# Patient Record
Sex: MALE | RURAL
[De-identification: names, ages, dates, MRNs, and addresses within clinical notes are randomized per-mention and may not be internally consistent; named-entity substitution may affect disease eponyms.]

---

## 2023-02-06 RX ORDER — FAMOTIDINE 20 MG/1
TABLET, FILM COATED ORAL
Qty: 30 TABLET | Refills: 5 | Status: SHIPPED | OUTPATIENT
Start: 2023-02-06

## 2023-02-09 ENCOUNTER — TELEPHONE (OUTPATIENT)
Dept: CARDIOLOGY | Facility: CLINIC | Age: 58
End: 2023-02-09

## 2023-02-09 NOTE — TELEPHONE ENCOUNTER
Patients wife called and said he needs a refill on his Rosuvastatin.  She said [facility] in [place] has sent a request a few times and they still can't get it refilled.  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 Any questions she said to call her at [phone]

## 2023-02-12 RX ORDER — ROSUVASTATIN CALCIUM 20 MG/1
20 TABLET, COATED ORAL DAILY
Qty: 90 TABLET | Refills: 3 | Status: SHIPPED | OUTPATIENT
Start: 2023-02-12

## 2023-02-15 NOTE — TELEPHONE ENCOUNTER
Patient called back today upset because he had not heard anything back. I read to him what was discussed and he said he has gotten the letter from here for the last 3 years. He said Dr. Galarza is the one he started with and then he started seeing [place] before she left. He would like to talk to someone about the DOT letter

## 2023-05-15 RX ORDER — SACUBITRIL AND VALSARTAN 24; 26 MG/1; MG/1
TABLET, FILM COATED ORAL
Qty: 60 TABLET | Refills: 3 | Status: SHIPPED | OUTPATIENT
Start: 2023-05-15

## 2023-08-28 RX ORDER — SACUBITRIL AND VALSARTAN 24; 26 MG/1; MG/1
TABLET, FILM COATED ORAL
Qty: 60 TABLET | Refills: 3 | Status: SHIPPED | OUTPATIENT
Start: 2023-08-28

## 2023-11-13 ENCOUNTER — TELEPHONE (OUTPATIENT)
Dept: CARDIOLOGY | Facility: CLINIC | Age: 58
End: 2023-11-13
Payer: COMMERCIAL

## 2023-11-13 NOTE — TELEPHONE ENCOUNTER
----- Message from Devan Marlon sent at 11/13/2023 10:59 AM EST -----  Regarding: New Patient (not me)  Contact: [phone]  Yes….. she has all that information.  Prefer a Tuesday or Thursday for appointment - even if that’s the  [place] location. 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  And her address is on our property -     [address]   [address] [facility] [address]    Thank you   
Can we make an appt for patient?  
LIMITED ROM/EFFUSION/SWELLING/JOINT SWELLING

## 2023-12-18 ENCOUNTER — TELEPHONE (OUTPATIENT)
Dept: CARDIOLOGY | Facility: CLINIC | Age: 58
End: 2023-12-18

## 2023-12-18 NOTE — TELEPHONE ENCOUNTER
----- Message from Jay Neo sent at 12/18/2023  9:38 AM EST -----  Regarding: ECHO RESULTS  Patient was scheduled to see Dr. Galarza after scan this morning. Patient is unable to reschedule for Friday and does not know when he can be seen again.. he works 6 days a week and had to take off today for his appt and it is not something he can keep doing. Is it possible to call with results once they have been read?

## 2023-12-22 RX ORDER — METOPROLOL SUCCINATE 25 MG/1
12.5 TABLET, EXTENDED RELEASE ORAL DAILY
Qty: 45 TABLET | Refills: 1 | Status: SHIPPED | OUTPATIENT
Start: 2023-12-22

## 2024-01-26 ENCOUNTER — PATIENT MESSAGE (OUTPATIENT)
Dept: CARDIOLOGY | Facility: CLINIC | Age: 59
End: 2024-01-26
Payer: COMMERCIAL

## 2024-01-26 ENCOUNTER — TELEPHONE (OUTPATIENT)
Dept: CARDIOLOGY | Facility: CLINIC | Age: 59
End: 2024-01-26
Payer: COMMERCIAL

## 2024-01-26 NOTE — LETTER
January 29, 2024           To whom it may concern,    Devan Mason was evaluated in our office on 6/19/2023.  He completed an echocardiogram on 12/18/2023 that revealed an EF of 48% and mild to moderate mitral valve regurgitation.  His heart function has improved from 45% in 2022 to 48% on most recent echo. His mitral valve regurgitation is stable. He is currently stable from a cardiac standpoint. If you need further information, please do not hesitate to contact our office.     Thank you,   JOSE Martin  [facility]

## 2024-01-26 NOTE — TELEPHONE ENCOUNTER
----- Message from Devan Mason sent at 1/26/2024  1:23 PM EST -----  Regarding: Letter  Contact: [phone]  I am in need of the letter to get my yearly DOT Physical!     Thank you so much

## 2024-01-29 NOTE — TELEPHONE ENCOUNTER
Patient is needing letter for DOT physical. Patient was called last week and was told that he needed an appointment, patient was very upset that an appointment was required. Patient has not been seen since 6/2023. Last EKG was in 2022. Please advise.

## 2024-02-29 ENCOUNTER — TELEPHONE (OUTPATIENT)
Dept: CARDIOLOGY | Facility: CLINIC | Age: 59
End: 2024-02-29
Payer: COMMERCIAL

## 2024-02-29 DIAGNOSIS — I25.810 CORONARY ARTERY DISEASE INVOLVING CORONARY BYPASS GRAFT OF NATIVE HEART WITHOUT ANGINA PECTORIS: Primary | ICD-10-CM

## 2024-02-29 DIAGNOSIS — I50.22 CHRONIC SYSTOLIC HEART FAILURE: ICD-10-CM

## 2024-02-29 NOTE — TELEPHONE ENCOUNTER
Patient's wife had sent a message in his FAB BAG regarding labs. That If you would like for them to get any lab work done before his next appointment on June 27th that they would get those done before that appointment.

## 2024-03-11 DIAGNOSIS — I25.810 CORONARY ARTERY DISEASE INVOLVING CORONARY BYPASS GRAFT OF NATIVE HEART WITHOUT ANGINA PECTORIS: ICD-10-CM

## 2024-03-11 RX ORDER — ROSUVASTATIN CALCIUM 40 MG/1
40 TABLET, COATED ORAL DAILY
Qty: 90 TABLET | Refills: 3 | Status: SHIPPED | OUTPATIENT
Start: 2024-03-11

## 2024-03-11 RX ORDER — SACUBITRIL AND VALSARTAN 24; 26 MG/1; MG/1
1 TABLET, FILM COATED ORAL 2 TIMES DAILY
Qty: 180 TABLET | Refills: 3 | Status: SHIPPED | OUTPATIENT
Start: 2024-03-11

## 2024-03-14 ENCOUNTER — TELEPHONE (OUTPATIENT)
Dept: CARDIOLOGY | Facility: CLINIC | Age: 59
End: 2024-03-14

## 2024-03-14 NOTE — TELEPHONE ENCOUNTER
Caller: KELLY MOODY    Relationship: Emergency Contact    Best call back number: [phone]    Requested Prescriptions:   Requested Prescriptions     Pending Prescriptions Disp Refills    omeprazole (priLOSEC) 20 MG capsule      famotidine (PEPCID) 20 MG tablet 30 tablet 5     Sig: Take 1 tablet by mouth Daily.        Pharmacy where request should be sent: [facility] - [place], [place] - [address] [phone] [facility] [phone]      Last office visit with prescribing clinician: 6/19/2023   Last telemedicine visit with prescribing clinician: Visit date not found   Next office visit with prescribing clinician: 6/27/2024     Additional details provided by patient: PATIENT HAS MORE THAN A 3 DAY SUPPLY. REQUESTING DR. SHAH FILL THE ABOVE FOR HIM. PLEASE ADVISE THE ABOVE IF THERE IS A PROBLEM.     Does the patient have less than a 3 day supply:  [] Yes  [x] No    Would you like a call back once the refill request has been completed: [] Yes [x] No    If the office needs to give you a call back, can they leave a voicemail: [] Yes [x] No    Jay Mack Rep   03/14/24 16:28 EDT

## 2024-03-14 NOTE — TELEPHONE ENCOUNTER
Caller: KELLY MOODY    Relationship: Emergency Contact    Best call back number: [phone]    What is the best time to reach you: ANY    What was the call regarding: PLEASE CALL THE ABOVE ABOUT THE LABS.     Is it okay if the provider responds through MyChart: NO

## 2024-03-15 RX ORDER — FAMOTIDINE 20 MG/1
20 TABLET, FILM COATED ORAL DAILY
Qty: 90 TABLET | Refills: 1 | Status: SHIPPED | OUTPATIENT
Start: 2024-03-15

## 2024-03-15 RX ORDER — OMEPRAZOLE 20 MG/1
20 CAPSULE, DELAYED RELEASE ORAL DAILY
Qty: 90 CAPSULE | Refills: 1 | Status: SHIPPED | OUTPATIENT
Start: 2024-03-15

## 2024-06-17 DIAGNOSIS — R53.82 CHRONIC FATIGUE: ICD-10-CM

## 2024-06-17 DIAGNOSIS — I25.810 CORONARY ARTERY DISEASE INVOLVING CORONARY BYPASS GRAFT OF NATIVE HEART WITHOUT ANGINA PECTORIS: ICD-10-CM

## 2024-06-17 DIAGNOSIS — I50.22 CHRONIC SYSTOLIC HEART FAILURE: ICD-10-CM

## 2024-06-27 ENCOUNTER — OFFICE VISIT (OUTPATIENT)
Age: 59
End: 2024-06-27
Payer: COMMERCIAL

## 2024-06-27 VITALS
WEIGHT: 224 LBS | BODY MASS INDEX: 30.34 KG/M2 | HEIGHT: 72 IN | OXYGEN SATURATION: 97 % | DIASTOLIC BLOOD PRESSURE: 70 MMHG | SYSTOLIC BLOOD PRESSURE: 118 MMHG | HEART RATE: 67 BPM

## 2024-06-27 DIAGNOSIS — I50.22 CHRONIC SYSTOLIC HEART FAILURE: ICD-10-CM

## 2024-06-27 DIAGNOSIS — I25.810 CORONARY ARTERY DISEASE INVOLVING CORONARY BYPASS GRAFT OF NATIVE HEART WITHOUT ANGINA PECTORIS: Primary | ICD-10-CM

## 2024-06-27 PROCEDURE — 99214 OFFICE O/P EST MOD 30 MIN: CPT | Performed by: INTERNAL MEDICINE

## 2024-06-27 PROCEDURE — 93000 ELECTROCARDIOGRAM COMPLETE: CPT | Performed by: INTERNAL MEDICINE

## 2024-06-27 RX ORDER — LANOLIN ALCOHOL/MO/W.PET/CERES
1000 CREAM (GRAM) TOPICAL
COMMUNITY

## 2024-06-27 RX ORDER — NITROGLYCERIN 0.4 MG/1
TABLET SUBLINGUAL
Qty: 100 TABLET | Refills: 11 | Status: SHIPPED | OUTPATIENT
Start: 2024-06-27

## 2024-06-27 RX ORDER — ACETAMINOPHEN 160 MG
2000 TABLET,DISINTEGRATING ORAL DAILY
COMMUNITY

## 2024-06-27 NOTE — PROGRESS NOTES
[facility] Provider Note     Date:   2024   Name: Devan Mason  :   1965  PCP: Krystle Dickson MD    Chief Complaint   Patient presents with    Coronary Artery Disease       Subjective     History of Present Illness  Devan Mason is a 59 y.o. male who presents today for follow-up on coronary artery disease  Little twitches and pressure like pain in his heart. On the left side. Its been in the last couple weeks and getting worse.   Getting out of breath pretty easy as well.   Has a lot of foot arch problems and leg problems and dose not feel like he can walk a treadmill.         Cardiology/sleep history  1.  Coronary artery disease  -Status post coronary artery bypass - LIMA to LAD/D1, SVG to PDA, SVG to OM  -LHC  -no other disease noted, grafts patent, LAD and % stenosed  2.  Hypertension  3.  Hypercholesterolemia  4.  Ischemic cardiomyopathy-EF 45%   5.  Mild to moderate mitral regurgitation     No Known Allergies    Current Outpatient Medications:     aspirin 81 MG chewable tablet, Chew 1 tablet Daily., Disp: , Rfl:     chlorpheniramine (Allergy Relief) 4 MG tablet, Take 2.5 tablets by mouth Every 6 (Six) Hours As Needed for Allergies., Disp: , Rfl:     Cholecalciferol (Vitamin D3) 50 MCG (2000 UT) capsule, Take 1 capsule by mouth Daily., Disp: , Rfl:     Cinnamon 500 MG tablet, Take  by mouth., Disp: , Rfl:     famotidine (PEPCID) 20 MG tablet, Take 1 tablet by mouth Daily., Disp: 90 tablet, Rfl: 1    metoprolol succinate XL (TOPROL-XL) 25 MG 24 hr tablet, Take 0.5 tablets by mouth Daily., Disp: 45 tablet, Rfl: 1    Multiple Vitamins-Minerals (MULTIVITAMIN ADULT, MINERALS, PO), Take  by mouth., Disp: , Rfl:     Omega-3 Fatty Acids (fish oil) 1000 MG capsule capsule, Take  by mouth Daily With Breakfast., Disp: , Rfl:     omeprazole (priLOSEC) 20 MG capsule, Take 1 capsule by mouth Daily., Disp: 90 capsule, Rfl: 1    Potassium 99 MG tablet, Take   "by mouth., Disp: , Rfl:     Psyllium (Metamucil) wafer wafer, Take  by mouth Daily., Disp: , Rfl:     rosuvastatin (CRESTOR) 40 MG tablet, Take 1 tablet by mouth Daily., Disp: 90 tablet, Rfl: 3    sacubitril-valsartan (Entresto) 24-26 MG tablet, Take 1 tablet by mouth 2 (Two) Times a Day., Disp: 180 tablet, Rfl: 3    sildenafil (REVATIO) 20 MG tablet, Take 1 tablet by mouth 3 (Three) Times a Day As Needed (erectile dysfunction)., Disp: 30 tablet, Rfl: 11    vitamin B-12 (CYANOCOBALAMIN) 1000 MCG tablet, Take 1 tablet by mouth. 3x per week, Disp: , Rfl:     nitroglycerin (NITROSTAT) 0.4 MG SL tablet, 1 under the tongue as needed for angina, may repeat q5mins for up three doses, Disp: 100 tablet, Rfl: 11    Past Medical History:   Diagnosis Date    Chronic kidney disease     Coronary artery disease     Hyperlipidemia     Hypertension       Past Surgical History:   Procedure Laterality Date    CARDIAC SURGERY  01/30/2017    bypass/ maker 100%, 2 over 90%, 2 over 30%     Family History   Problem Relation Age of Onset    Heart disease Mother     Cancer Mother     Heart disease Brother      Social History     Socioeconomic History    Marital status:    Tobacco Use    Smoking status: Never     Passive exposure: Never    Smokeless tobacco: Never   Vaping Use    Vaping status: Never Used   Substance and Sexual Activity    Alcohol use: Yes     Comment: occ    Drug use: Never    Sexual activity: Defer       Objective     Vital Signs:  /70   Pulse 67   Ht 182.9 cm (72\")   Wt 102 kg (224 lb)   SpO2 97%   BMI 30.38 kg/m²   Estimated body mass index is 30.38 kg/m² as calculated from the following:    Height as of this encounter: 182.9 cm (72\").    Weight as of this encounter: 102 kg (224 lb).         Physical Exam  Constitutional:       Appearance: Normal appearance. He is well-developed.   HENT:      Head: Normocephalic and atraumatic.   Eyes:      General: No scleral icterus.     Pupils: Pupils are equal, " round, and reactive to light.   Cardiovascular:      Rate and Rhythm: Normal rate and regular rhythm.      Heart sounds: Normal heart sounds. No murmur heard.  Pulmonary:      Breath sounds: Normal breath sounds. No wheezing or rhonchi.   Musculoskeletal:      Right lower leg: No edema.      Left lower leg: No edema.   Skin:     Capillary Refill: Capillary refill takes less than 2 seconds.      Coloration: Skin is not cyanotic.      Nails: There is no clubbing.   Neurological:      Mental Status: He is alert and oriented to person, place, and time.      Motor: No weakness.      Gait: Gait normal.   Psychiatric:         Mood and Affect: Mood normal.         Behavior: Behavior is cooperative.         Thought Content: Thought content normal.         Cognition and Memory: Memory normal.                 ECG 12 Lead    Date/Time: 6/27/2024 4:06 PM  Performed by: Mandie Galarza MD    Authorized by: Mandie Galarza MD  Comparison: compared with previous ECG   Comparison to previous ECG: Inverted T waves inferiorly  Rhythm: sinus rhythm  Rate: normal  T inversion: V4, V5 and V6  QRS axis: normal  Other: no other findings    Clinical impression: abnormal EKG           Assessment and Plan     Diagnoses and all orders for this visit:    1. Coronary artery disease involving coronary bypass graft of native heart without angina pectoris (Primary)  Comments:  Planning stress testing.  Does not seem like fluid overload.  Orders:  -     Stress Test With Myocardial Perfusion One Day; Future  -     nitroglycerin (NITROSTAT) 0.4 MG SL tablet; 1 under the tongue as needed for angina, may repeat q5mins for up three doses  Dispense: 100 tablet; Refill: 11  -     ECG 12 Lead    2. Chronic systolic heart failure  Comments:  Euvolemic on exam today and no edema.  Echo has been updated recently and is fairly stable from prior.        Recommendations: ER if symptoms increase and Report if any new/changing symptoms immediately      Follow  Up  No follow-ups on file.  After stress testing   Mandie Galarza MD   06/27/2024     Please note that this explicitly excludes time spent on other separate billable services such as performing procedures or test interpretation, when applicable.    This note was created using dictation software which occasionally transcribes nonsensical phrases. Please contact the provider if any clarification is needed.

## 2024-07-01 RX ORDER — METOPROLOL SUCCINATE 25 MG/1
12.5 TABLET, EXTENDED RELEASE ORAL DAILY
Qty: 45 TABLET | Refills: 1 | Status: SHIPPED | OUTPATIENT
Start: 2024-07-01

## 2024-08-20 ENCOUNTER — LAB (OUTPATIENT)
Facility: HOSPITAL | Age: 59
End: 2024-08-20
Payer: COMMERCIAL

## 2024-08-20 ENCOUNTER — HOSPITAL ENCOUNTER (OUTPATIENT)
Facility: HOSPITAL | Age: 59
Discharge: HOME | End: 2024-08-20
Payer: COMMERCIAL

## 2024-08-20 ENCOUNTER — OFFICE VISIT (OUTPATIENT)
Age: 59
End: 2024-08-20
Payer: COMMERCIAL

## 2024-08-20 VITALS
BODY MASS INDEX: 30.34 KG/M2 | HEART RATE: 77 BPM | SYSTOLIC BLOOD PRESSURE: 122 MMHG | DIASTOLIC BLOOD PRESSURE: 72 MMHG | HEIGHT: 72 IN | WEIGHT: 224 LBS | OXYGEN SATURATION: 97 %

## 2024-08-20 DIAGNOSIS — I25.810 CORONARY ARTERY DISEASE INVOLVING CORONARY BYPASS GRAFT OF NATIVE HEART WITHOUT ANGINA PECTORIS: ICD-10-CM

## 2024-08-20 DIAGNOSIS — I50.22 CHRONIC SYSTOLIC HEART FAILURE: ICD-10-CM

## 2024-08-20 DIAGNOSIS — I25.810 CORONARY ARTERY DISEASE INVOLVING CORONARY BYPASS GRAFT OF NATIVE HEART WITHOUT ANGINA PECTORIS: Primary | ICD-10-CM

## 2024-08-20 LAB
BASOPHILS # BLD AUTO: 0.1 10*3/MM3 (ref 0–0.2)
BASOPHILS NFR BLD AUTO: 0.9 % (ref 0–1.5)
BH CV REST NUCLEAR ISOTOPE DOSE: 9.7 MCI
BH CV STRESS BP STAGE 2: NORMAL
BH CV STRESS BP STAGE 4: NORMAL
BH CV STRESS COMMENTS STAGE 1: NORMAL
BH CV STRESS DOSE REGADENOSON STAGE 1: 0.4
BH CV STRESS DURATION MIN STAGE 1: 1
BH CV STRESS DURATION MIN STAGE 2: 1
BH CV STRESS DURATION MIN STAGE 3: 1
BH CV STRESS DURATION MIN STAGE 4: 1
BH CV STRESS DURATION SEC STAGE 1: 0
BH CV STRESS DURATION SEC STAGE 2: 0
BH CV STRESS DURATION SEC STAGE 3: 0
BH CV STRESS DURATION SEC STAGE 4: 0
BH CV STRESS HR STAGE 1: 68
BH CV STRESS HR STAGE 2: 83
BH CV STRESS HR STAGE 3: 75
BH CV STRESS HR STAGE 4: 73
BH CV STRESS NUCLEAR ISOTOPE DOSE: 32.6 MCI
BH CV STRESS O2 STAGE 1: 95
BH CV STRESS O2 STAGE 2: 96
BH CV STRESS O2 STAGE 3: 98
BH CV STRESS O2 STAGE 4: 99
BH CV STRESS PROTOCOL 1: NORMAL
BH CV STRESS RECOVERY BP: NORMAL MMHG
BH CV STRESS RECOVERY HR: 67 BPM
BH CV STRESS RECOVERY O2: 98 %
BH CV STRESS STAGE 1: 1
BH CV STRESS STAGE 2: 2
BH CV STRESS STAGE 3: 3
BH CV STRESS STAGE 4: 4
DEPRECATED RDW RBC AUTO: 43.8 FL (ref 37–54)
EOSINOPHIL # BLD AUTO: 0.19 10*3/MM3 (ref 0–0.4)
EOSINOPHIL NFR BLD AUTO: 1.7 % (ref 0.3–6.2)
ERYTHROCYTE [DISTWIDTH] IN BLOOD BY AUTOMATED COUNT: 13.1 % (ref 12.3–15.4)
HCT VFR BLD AUTO: 50 % (ref 37.5–51)
HGB BLD-MCNC: 17.2 G/DL (ref 13–17.7)
IMM GRANULOCYTES # BLD AUTO: 0.08 10*3/MM3 (ref 0–0.05)
IMM GRANULOCYTES NFR BLD AUTO: 0.7 % (ref 0–0.5)
LV EF NUC BP: 39 %
LYMPHOCYTES # BLD AUTO: 2.74 10*3/MM3 (ref 0.7–3.1)
LYMPHOCYTES NFR BLD AUTO: 25.1 % (ref 19.6–45.3)
MAXIMAL PREDICTED HEART RATE: 159 BPM
MCH RBC QN AUTO: 31.3 PG (ref 26.6–33)
MCHC RBC AUTO-ENTMCNC: 34.4 G/DL (ref 31.5–35.7)
MCV RBC AUTO: 91.1 FL (ref 79–97)
MONOCYTES # BLD AUTO: 0.81 10*3/MM3 (ref 0.1–0.9)
MONOCYTES NFR BLD AUTO: 7.4 % (ref 5–12)
NEUTROPHILS NFR BLD AUTO: 64.2 % (ref 42.7–76)
NEUTROPHILS NFR BLD AUTO: 7 10*3/MM3 (ref 1.7–7)
NRBC BLD AUTO-RTO: 0 /100 WBC (ref 0–0.2)
PERCENT MAX PREDICTED HR: 52.2 %
PLATELET # BLD AUTO: 224 10*3/MM3 (ref 140–450)
PMV BLD AUTO: 9.9 FL (ref 6–12)
RBC # BLD AUTO: 5.49 10*6/MM3 (ref 4.14–5.8)
STRESS BASELINE BP: NORMAL MMHG
STRESS BASELINE HR: 62 BPM
STRESS O2 SAT REST: 97 %
STRESS PERCENT HR: 61 %
STRESS POST ESTIMATED WORKLOAD: 1 METS
STRESS POST EXERCISE DUR MIN: 4 MIN
STRESS POST EXERCISE DUR SEC: 0 SEC
STRESS POST O2 SAT PEAK: 99 %
STRESS POST PEAK BP: NORMAL MMHG
STRESS POST PEAK HR: 83 BPM
STRESS TARGET HR: 135 BPM
WBC NRBC COR # BLD AUTO: 10.92 10*3/MM3 (ref 3.4–10.8)

## 2024-08-20 PROCEDURE — 78452 HT MUSCLE IMAGE SPECT MULT: CPT

## 2024-08-20 PROCEDURE — 93017 CV STRESS TEST TRACING ONLY: CPT

## 2024-08-20 PROCEDURE — 36415 COLL VENOUS BLD VENIPUNCTURE: CPT

## 2024-08-20 PROCEDURE — A9500 TC99M SESTAMIBI: HCPCS | Performed by: INTERNAL MEDICINE

## 2024-08-20 PROCEDURE — 99214 OFFICE O/P EST MOD 30 MIN: CPT | Performed by: INTERNAL MEDICINE

## 2024-08-20 PROCEDURE — 25010000002 REGADENOSON 0.4 MG/5ML SOLUTION: Performed by: INTERNAL MEDICINE

## 2024-08-20 PROCEDURE — 80053 COMPREHEN METABOLIC PANEL: CPT

## 2024-08-20 PROCEDURE — 85025 COMPLETE CBC W/AUTO DIFF WBC: CPT

## 2024-08-20 PROCEDURE — 0 TECHNETIUM SESTAMIBI: Performed by: INTERNAL MEDICINE

## 2024-08-20 RX ORDER — REGADENOSON 0.08 MG/ML
0.4 INJECTION, SOLUTION INTRAVENOUS ONCE
Status: COMPLETED | OUTPATIENT
Start: 2024-08-20 | End: 2024-08-20

## 2024-08-20 RX ADMIN — TECHNETIUM TC 99M SESTAMIBI 1 DOSE: 1 INJECTION INTRAVENOUS at 08:10

## 2024-08-20 RX ADMIN — REGADENOSON 0.4 MG: 0.08 INJECTION INTRAVENOUS at 09:41

## 2024-08-20 RX ADMIN — TECHNETIUM TC 99M SESTAMIBI 1 DOSE: 1 INJECTION INTRAVENOUS at 09:45

## 2024-08-20 NOTE — H&P (VIEW-ONLY)
[facility] Provider Note     Date:   2024   Name: Devan Mason  :   1965  PCP: Krystle Dickson MD    Chief Complaint   Patient presents with    Coronary Artery Disease     Deepthi Results       Subjective     History of Present Illness  Devan Mason is a 59 y.o. male who presents today for follow up CAD and lexiscan  He does note he has been more tired than usual. Does have occasional chest heaviness.  His stress test showed mostly scarring but he feels like his symptoms have gotten worse especially in the last 6 months to a year.    Cardiology/sleep history  1.  Coronary artery disease  -Status post coronary artery bypass - LIMA to LAD/D1, SVG to PDA, SVG to OM  -LHC  -no other disease noted, grafts patent, LAD and % stenosed  2.  Hypertension  3.  Hypercholesterolemia  4.  Ischemic cardiomyopathy-EF 45%   5.  Mild to moderate mitral regurgitation     No Known Allergies    Current Outpatient Medications:     aspirin 81 MG chewable tablet, Chew 1 tablet Daily., Disp: , Rfl:     chlorpheniramine (Allergy Relief) 4 MG tablet, Take 2.5 tablets by mouth Every 6 (Six) Hours As Needed for Allergies., Disp: , Rfl:     Cholecalciferol (Vitamin D3) 50 MCG (2000 UT) capsule, Take 1 capsule by mouth Daily., Disp: , Rfl:     Cinnamon 500 MG tablet, Take  by mouth., Disp: , Rfl:     famotidine (PEPCID) 20 MG tablet, Take 1 tablet by mouth Daily., Disp: 90 tablet, Rfl: 1    metoprolol succinate XL (TOPROL-XL) 25 MG 24 hr tablet, Take 0.5 tablets by mouth Daily., Disp: 45 tablet, Rfl: 1    Multiple Vitamins-Minerals (MULTIVITAMIN ADULT, MINERALS, PO), Take  by mouth., Disp: , Rfl:     nitroglycerin (NITROSTAT) 0.4 MG SL tablet, 1 under the tongue as needed for angina, may repeat q5mins for up three doses, Disp: 100 tablet, Rfl: 11    Omega-3 Fatty Acids (fish oil) 1000 MG capsule capsule, Take  by mouth Daily With Breakfast., Disp: , Rfl:     omeprazole  "(priLOSEC) 20 MG capsule, Take 1 capsule by mouth Daily., Disp: 90 capsule, Rfl: 1    Potassium 99 MG tablet, Take  by mouth., Disp: , Rfl:     Psyllium (Metamucil) wafer wafer, Take  by mouth Daily., Disp: , Rfl:     rosuvastatin (CRESTOR) 40 MG tablet, Take 1 tablet by mouth Daily., Disp: 90 tablet, Rfl: 3    sacubitril-valsartan (Entresto) 24-26 MG tablet, Take 1 tablet by mouth 2 (Two) Times a Day., Disp: 180 tablet, Rfl: 3    sildenafil (REVATIO) 20 MG tablet, Take 1 tablet by mouth 3 (Three) Times a Day As Needed (erectile dysfunction)., Disp: 30 tablet, Rfl: 11    vitamin B-12 (CYANOCOBALAMIN) 1000 MCG tablet, Take 1 tablet by mouth. 3x per week, Disp: , Rfl:   No current facility-administered medications for this visit.    Past Medical History:   Diagnosis Date    Chronic kidney disease     Coronary artery disease     Hyperlipidemia     Hypertension       Past Surgical History:   Procedure Laterality Date    CARDIAC SURGERY  01/30/2017    bypass/ maker 100%, 2 over 90%, 2 over 30%     Family History   Problem Relation Age of Onset    Heart disease Mother     Cancer Mother     Heart disease Brother      Social History     Socioeconomic History    Marital status:    Tobacco Use    Smoking status: Never     Passive exposure: Never    Smokeless tobacco: Never   Vaping Use    Vaping status: Never Used   Substance and Sexual Activity    Alcohol use: Yes     Comment: occ    Drug use: Never    Sexual activity: Yes     Partners: Female       Objective     Vital Signs:  /72   Pulse 77   Ht 182.9 cm (72\")   Wt 102 kg (224 lb)   SpO2 97%   BMI 30.38 kg/m²   Estimated body mass index is 30.38 kg/m² as calculated from the following:    Height as of this encounter: 182.9 cm (72\").    Weight as of this encounter: 102 kg (224 lb).         Physical Exam  Constitutional:       Appearance: Normal appearance. He is well-developed.   HENT:      Head: Normocephalic and atraumatic.   Eyes:      General: No " scleral icterus.     Pupils: Pupils are equal, round, and reactive to light.   Cardiovascular:      Rate and Rhythm: Normal rate and regular rhythm.      Heart sounds: Normal heart sounds. No murmur heard.  Pulmonary:      Breath sounds: Normal breath sounds. No wheezing or rhonchi.   Musculoskeletal:      Right lower leg: No edema.      Left lower leg: No edema.   Skin:     Capillary Refill: Capillary refill takes less than 2 seconds.      Coloration: Skin is not cyanotic.      Nails: There is no clubbing.   Neurological:      Mental Status: He is alert and oriented to person, place, and time.      Motor: No weakness.      Gait: Gait normal.   Psychiatric:         Mood and Affect: Mood normal.         Behavior: Behavior is cooperative.         Thought Content: Thought content normal.         Cognition and Memory: Memory normal.                     Assessment and Plan     Diagnoses and all orders for this visit:    1. Coronary artery disease involving coronary bypass graft of native heart without angina pectoris (Primary)  Comments:  Discussed risks and benefits of heart catheterization.  Patient willing to proceed.  He would be anxious without proceeding.  Orders:  -     Case Request Cath Lab: Left Heart Cath  -     CBC & Differential; Future  -     Comprehensive Metabolic Panel; Future    2. Chronic systolic heart failure  Comments:  May increase Entresto dose after heart cath.              Follow Up  No follow-ups on file.    Mandie Galarza MD   08/20/2024     Please note that this explicitly excludes time spent on other separate billable services such as performing procedures or test interpretation, when applicable.    This note was created using dictation software which occasionally transcribes nonsensical phrases. Please contact the provider if any clarification is needed.

## 2024-08-21 LAB
ALBUMIN SERPL-MCNC: 4.8 G/DL (ref 3.5–5.2)
ALBUMIN/GLOB SERPL: 1.9 G/DL
ALP SERPL-CCNC: 77 U/L (ref 39–117)
ALT SERPL W P-5'-P-CCNC: 41 U/L (ref 1–41)
ANION GAP SERPL CALCULATED.3IONS-SCNC: 13 MMOL/L (ref 5–15)
AST SERPL-CCNC: 25 U/L (ref 1–40)
BILIRUB SERPL-MCNC: 0.4 MG/DL (ref 0–1.2)
BUN SERPL-MCNC: 17 MG/DL (ref 8–23)
BUN/CREAT SERPL: 12.5 (ref 7–25)
CALCIUM SPEC-SCNC: 9.7 MG/DL (ref 8.6–10.5)
CHLORIDE SERPL-SCNC: 102 MMOL/L (ref 98–107)
CO2 SERPL-SCNC: 24 MMOL/L (ref 22–29)
CREAT SERPL-MCNC: 1.36 MG/DL (ref 0.76–1.27)
EGFRCR SERPLBLD CKD-EPI 2021: 59.2 ML/MIN/1.73
GLOBULIN UR ELPH-MCNC: 2.5 GM/DL
GLUCOSE SERPL-MCNC: 98 MG/DL (ref 65–99)
POTASSIUM SERPL-SCNC: 4.2 MMOL/L (ref 3.5–5.2)
PROT SERPL-MCNC: 7.3 G/DL (ref 6–8.5)
SODIUM SERPL-SCNC: 139 MMOL/L (ref 136–145)

## 2024-08-30 ENCOUNTER — APPOINTMENT (OUTPATIENT)
Dept: GENERAL RADIOLOGY | Facility: HOSPITAL | Age: 59
End: 2024-08-30
Payer: COMMERCIAL

## 2024-08-30 ENCOUNTER — HOSPITAL ENCOUNTER (OUTPATIENT)
Facility: HOSPITAL | Age: 59
Setting detail: HOSPITAL OUTPATIENT SURGERY
Discharge: HOME | End: 2024-08-30
Attending: INTERNAL MEDICINE | Admitting: INTERNAL MEDICINE
Payer: COMMERCIAL

## 2024-08-30 VITALS
OXYGEN SATURATION: 94 % | HEART RATE: 56 BPM | SYSTOLIC BLOOD PRESSURE: 124 MMHG | DIASTOLIC BLOOD PRESSURE: 87 MMHG | TEMPERATURE: 97.3 F | RESPIRATION RATE: 16 BRPM

## 2024-08-30 DIAGNOSIS — I25.810 CORONARY ARTERY DISEASE INVOLVING CORONARY BYPASS GRAFT OF NATIVE HEART WITHOUT ANGINA PECTORIS: ICD-10-CM

## 2024-08-30 LAB
ALBUMIN SERPL-MCNC: 4.4 G/DL (ref 3.5–5.2)
ALBUMIN/GLOB SERPL: 1.7 G/DL
ALP SERPL-CCNC: 67 U/L (ref 39–117)
ALT SERPL W P-5'-P-CCNC: 40 U/L (ref 1–41)
ANION GAP SERPL CALCULATED.3IONS-SCNC: 10 MMOL/L (ref 5–15)
AST SERPL-CCNC: 28 U/L (ref 1–40)
BILIRUB SERPL-MCNC: 0.6 MG/DL (ref 0–1.2)
BUN SERPL-MCNC: 14 MG/DL (ref 8–23)
BUN/CREAT SERPL: 11.4 (ref 7–25)
CALCIUM SPEC-SCNC: 8.9 MG/DL (ref 8.6–10.5)
CATH EF ESTIMATED: 30 %
CHLORIDE SERPL-SCNC: 103 MMOL/L (ref 98–107)
CHOLEST SERPL-MCNC: 141 MG/DL (ref 0–200)
CO2 SERPL-SCNC: 24 MMOL/L (ref 22–29)
CREAT SERPL-MCNC: 1.23 MG/DL (ref 0.76–1.27)
DEPRECATED RDW RBC AUTO: 43 FL (ref 37–54)
EGFRCR SERPLBLD CKD-EPI 2021: 66.8 ML/MIN/1.73
ERYTHROCYTE [DISTWIDTH] IN BLOOD BY AUTOMATED COUNT: 13.1 % (ref 12.3–15.4)
GLOBULIN UR ELPH-MCNC: 2.6 GM/DL
GLUCOSE SERPL-MCNC: 93 MG/DL (ref 65–99)
HBA1C MFR BLD: 5.7 % (ref 4.8–5.6)
HCT VFR BLD AUTO: 47.4 % (ref 37.5–51)
HDLC SERPL-MCNC: 39 MG/DL (ref 40–60)
HGB BLD-MCNC: 16.1 G/DL (ref 13–17.7)
LDLC SERPL CALC-MCNC: 78 MG/DL (ref 0–100)
LDLC/HDLC SERPL: 1.93 {RATIO}
MCH RBC QN AUTO: 30.8 PG (ref 26.6–33)
MCHC RBC AUTO-ENTMCNC: 34 G/DL (ref 31.5–35.7)
MCV RBC AUTO: 90.8 FL (ref 79–97)
PLATELET # BLD AUTO: 172 10*3/MM3 (ref 140–450)
PMV BLD AUTO: 9.4 FL (ref 6–12)
POTASSIUM SERPL-SCNC: 4.7 MMOL/L (ref 3.5–5.2)
PROT SERPL-MCNC: 7 G/DL (ref 6–8.5)
RBC # BLD AUTO: 5.22 10*6/MM3 (ref 4.14–5.8)
SODIUM SERPL-SCNC: 137 MMOL/L (ref 136–145)
TRIGL SERPL-MCNC: 134 MG/DL (ref 0–150)
VLDLC SERPL-MCNC: 24 MG/DL (ref 5–40)
WBC NRBC COR # BLD AUTO: 9.12 10*3/MM3 (ref 3.4–10.8)

## 2024-08-30 PROCEDURE — 25010000002 MIDAZOLAM PER 1 MG: Performed by: INTERNAL MEDICINE

## 2024-08-30 PROCEDURE — C1894 INTRO/SHEATH, NON-LASER: HCPCS | Performed by: INTERNAL MEDICINE

## 2024-08-30 PROCEDURE — 25010000002 HEPARIN (PORCINE) PER 1000 UNITS: Performed by: INTERNAL MEDICINE

## 2024-08-30 PROCEDURE — 25810000003 SODIUM CHLORIDE 0.9 % SOLUTION: Performed by: NURSE PRACTITIONER

## 2024-08-30 PROCEDURE — 25810000003 SODIUM CHLORIDE 0.9 % SOLUTION: Performed by: INTERNAL MEDICINE

## 2024-08-30 PROCEDURE — 25010000002 NICARDIPINE 2.5 MG/ML SOLUTION: Performed by: INTERNAL MEDICINE

## 2024-08-30 PROCEDURE — 93459 L HRT ART/GRFT ANGIO: CPT | Performed by: INTERNAL MEDICINE

## 2024-08-30 PROCEDURE — 25510000001 IOPAMIDOL PER 1 ML: Performed by: INTERNAL MEDICINE

## 2024-08-30 PROCEDURE — 25010000002 FENTANYL CITRATE (PF) 50 MCG/ML SOLUTION: Performed by: INTERNAL MEDICINE

## 2024-08-30 PROCEDURE — 83036 HEMOGLOBIN GLYCOSYLATED A1C: CPT | Performed by: NURSE PRACTITIONER

## 2024-08-30 PROCEDURE — 25010000002 PHENYLEPHRINE 10 MG/ML SOLUTION: Performed by: INTERNAL MEDICINE

## 2024-08-30 PROCEDURE — 80053 COMPREHEN METABOLIC PANEL: CPT | Performed by: NURSE PRACTITIONER

## 2024-08-30 PROCEDURE — 80061 LIPID PANEL: CPT | Performed by: NURSE PRACTITIONER

## 2024-08-30 PROCEDURE — 85027 COMPLETE CBC AUTOMATED: CPT | Performed by: NURSE PRACTITIONER

## 2024-08-30 PROCEDURE — 36415 COLL VENOUS BLD VENIPUNCTURE: CPT

## 2024-08-30 PROCEDURE — C1769 GUIDE WIRE: HCPCS | Performed by: INTERNAL MEDICINE

## 2024-08-30 PROCEDURE — 71045 X-RAY EXAM CHEST 1 VIEW: CPT

## 2024-08-30 RX ORDER — LIDOCAINE HYDROCHLORIDE 10 MG/ML
INJECTION, SOLUTION EPIDURAL; INFILTRATION; INTRACAUDAL; PERINEURAL
Status: DISCONTINUED | OUTPATIENT
Start: 2024-08-30 | End: 2024-08-30 | Stop reason: HOSPADM

## 2024-08-30 RX ORDER — HEPARIN SODIUM 1000 [USP'U]/ML
INJECTION, SOLUTION INTRAVENOUS; SUBCUTANEOUS
Status: DISCONTINUED | OUTPATIENT
Start: 2024-08-30 | End: 2024-08-30 | Stop reason: HOSPADM

## 2024-08-30 RX ORDER — ACETAMINOPHEN 325 MG/1
650 TABLET ORAL EVERY 4 HOURS PRN
Status: DISCONTINUED | OUTPATIENT
Start: 2024-08-30 | End: 2024-08-30 | Stop reason: HOSPADM

## 2024-08-30 RX ORDER — SODIUM CHLORIDE 0.9 % (FLUSH) 0.9 %
10 SYRINGE (ML) INJECTION EVERY 12 HOURS SCHEDULED
Status: DISCONTINUED | OUTPATIENT
Start: 2024-08-30 | End: 2024-08-30 | Stop reason: HOSPADM

## 2024-08-30 RX ORDER — SODIUM CHLORIDE 9 MG/ML
40 INJECTION, SOLUTION INTRAVENOUS AS NEEDED
Status: DISCONTINUED | OUTPATIENT
Start: 2024-08-30 | End: 2024-08-30 | Stop reason: HOSPADM

## 2024-08-30 RX ORDER — ASPIRIN 325 MG
325 TABLET, DELAYED RELEASE (ENTERIC COATED) ORAL DAILY
Status: DISCONTINUED | OUTPATIENT
Start: 2024-08-31 | End: 2024-08-30 | Stop reason: HOSPADM

## 2024-08-30 RX ORDER — PHENYLEPHRINE HYDROCHLORIDE 10 MG/ML
INJECTION INTRAVENOUS
Status: DISCONTINUED | OUTPATIENT
Start: 2024-08-30 | End: 2024-08-30 | Stop reason: HOSPADM

## 2024-08-30 RX ORDER — ASPIRIN 325 MG
325 TABLET ORAL ONCE
Status: COMPLETED | OUTPATIENT
Start: 2024-08-30 | End: 2024-08-30

## 2024-08-30 RX ORDER — MIDAZOLAM HYDROCHLORIDE 1 MG/ML
INJECTION INTRAMUSCULAR; INTRAVENOUS
Status: DISCONTINUED | OUTPATIENT
Start: 2024-08-30 | End: 2024-08-30 | Stop reason: HOSPADM

## 2024-08-30 RX ORDER — FENTANYL CITRATE 50 UG/ML
INJECTION, SOLUTION INTRAMUSCULAR; INTRAVENOUS
Status: DISCONTINUED | OUTPATIENT
Start: 2024-08-30 | End: 2024-08-30 | Stop reason: HOSPADM

## 2024-08-30 RX ORDER — SODIUM CHLORIDE 0.9 % (FLUSH) 0.9 %
1-10 SYRINGE (ML) INJECTION AS NEEDED
Status: DISCONTINUED | OUTPATIENT
Start: 2024-08-30 | End: 2024-08-30 | Stop reason: HOSPADM

## 2024-08-30 RX ORDER — ONDANSETRON 2 MG/ML
4 INJECTION INTRAMUSCULAR; INTRAVENOUS EVERY 6 HOURS PRN
Status: DISCONTINUED | OUTPATIENT
Start: 2024-08-30 | End: 2024-08-30 | Stop reason: HOSPADM

## 2024-08-30 RX ORDER — NITROGLYCERIN 0.4 MG/1
0.4 TABLET SUBLINGUAL
Status: DISCONTINUED | OUTPATIENT
Start: 2024-08-30 | End: 2024-08-30 | Stop reason: HOSPADM

## 2024-08-30 RX ORDER — NICARDIPINE HCL-0.9% SOD CHLOR 1 MG/10 ML
SYRINGE (ML) INTRAVENOUS
Status: DISCONTINUED | OUTPATIENT
Start: 2024-08-30 | End: 2024-08-30 | Stop reason: HOSPADM

## 2024-08-30 RX ORDER — IOPAMIDOL 755 MG/ML
INJECTION, SOLUTION INTRAVASCULAR
Status: DISCONTINUED | OUTPATIENT
Start: 2024-08-30 | End: 2024-08-30 | Stop reason: HOSPADM

## 2024-08-30 RX ADMIN — ASPIRIN 325 MG: 325 TABLET ORAL at 10:44

## 2024-08-30 RX ADMIN — SODIUM CHLORIDE 306 ML: 9 INJECTION, SOLUTION INTRAVENOUS at 10:45

## 2024-08-30 NOTE — INTERVAL H&P NOTE
H&P reviewed. The patient was examined and there are no changes to the H&P.    Will proceed to cardiac catheterization plus or minus catheter-based intervention today via left radial access.  This was discussed with the patient.  He verbalizes understanding and wishes to proceed.  Further recommendations to follow procedure.    JOSE Meeks

## 2024-08-30 NOTE — CONSULTS
Diabetes Education    Patient Name:  Devan Mason  YOB: 1965  MRN: [id number]  Admit Date:  8/30/2024      Consult received for diabetes education. Chart reviewed. A1c is 5.7%, no history of diabetes. Please reconsult as needed.       Electronically signed by:  Tianna Dey RN  08/30/24 14:26 EDT

## 2024-08-30 NOTE — Clinical Note
Hemostasis started on the left radial artery. R-Band was used in achieving hemostasis. Radial compression device applied to vessel. Hemostasis achieved successfully. Closure device additional comment: 10 cc air

## 2024-09-12 ENCOUNTER — TELEPHONE (OUTPATIENT)
Age: 59
End: 2024-09-12
Payer: COMMERCIAL

## 2024-09-12 NOTE — LETTER
Cardiac Risk Assessment Review        Patient Name: Devan Mason  Patient :   1965  Surgical Procedure: Left shoulder scope  Date of clearance: 2024  Date of last office visit: 2024   Procedure/Test Performed Date   [x] Stress Test 2024             [x] Heart Cath 2024     Based on the above test results and/or clinical evaluation, it is my recommendation:    [x] Patient has an acceptable cardiac risk for the procedure as planned.    [x] Patient is not listed on any blood thinners that would need to be held.        [x] Beta blockers should not be held in the perioperative period.      If you have any questions, please call our office at [phone].    Thank you,      Mandie Galarza MD   [facility] [place]      GovindIsabel leonard, [facility] Rep  [phone]  Signed  [facility] is requesting cardiac clearance for a L shoulder scope with general anesthesia. Cardiac clearance form scanned in

## 2024-09-12 NOTE — TELEPHONE ENCOUNTER
[facility] is requesting cardiac clearance for a L shoulder scope with general anesthesia. Cardiac clearance form scanned in

## 2024-09-27 RX ORDER — FAMOTIDINE 20 MG/1
20 TABLET, FILM COATED ORAL DAILY
Qty: 90 TABLET | Refills: 1 | Status: SHIPPED | OUTPATIENT
Start: 2024-09-27

## 2024-10-03 ENCOUNTER — OFFICE VISIT (OUTPATIENT)
Age: 59
End: 2024-10-03
Payer: COMMERCIAL

## 2024-10-03 VITALS
BODY MASS INDEX: 31.02 KG/M2 | SYSTOLIC BLOOD PRESSURE: 124 MMHG | DIASTOLIC BLOOD PRESSURE: 78 MMHG | WEIGHT: 229 LBS | HEART RATE: 72 BPM | RESPIRATION RATE: 18 BRPM | HEIGHT: 72 IN

## 2024-10-03 DIAGNOSIS — I50.22 CHRONIC SYSTOLIC HEART FAILURE: ICD-10-CM

## 2024-10-03 DIAGNOSIS — I25.810 CORONARY ARTERY DISEASE INVOLVING CORONARY BYPASS GRAFT OF NATIVE HEART WITHOUT ANGINA PECTORIS: ICD-10-CM

## 2024-10-03 DIAGNOSIS — Z01.810 PRE-OPERATIVE CARDIOVASCULAR EXAMINATION: Primary | ICD-10-CM

## 2024-10-03 PROCEDURE — 99214 OFFICE O/P EST MOD 30 MIN: CPT | Performed by: INTERNAL MEDICINE

## 2024-10-03 NOTE — LETTER
October 14, 2024     Uzair Galarza MD  [address]  [address]  [facility] [address]    Patient: Devan Mason   YOB: 1965   Date of Visit: 10/3/2024       Dear Uzair Galarza MD:    Thank you for referring Devan Mason to me for evaluation. Below are the relevant portions of my assessment and plan of care.    Encounter Diagnosis and Orders:  Diagnoses and all orders for this visit:    1. Pre-operative cardiovascular examination (Primary)  Comments:  OK for shoulder surgery. intermediate risk, optimized from a medical standpoint.    2. Chronic systolic heart failure  Comments:  on medical therapy, patient willing see about getting ICD with EP cardiology. will need a repeat EF in 90 days. EF declined at cath despite medical therapy  Orders:  -     Adult Transthoracic Echo Complete W/ Cont if Necessary Per Protocol; Future  -     Ambulatory Referral to Cardiac Electrophysiology    3. Coronary artery disease involving coronary bypass graft of native heart without angina pectoris  Comments:  cath reviewed, all bypass grafts stable, no indication for PCI, continue prevention and medical managment. No anginal syptoms.      If you have questions, please do not hesitate to call me. I look forward to following Devan along with you.         Sincerely,        Mandie Galarza MD        CC: No Recipients

## 2024-10-03 NOTE — PROGRESS NOTES
[facility] Provider Note     Date:   10/03/2024   Name: Devan Mason  :   1965  PCP: Krystle Dickson MD    Chief Complaint   Patient presents with    Follow-up     Follow up from heart cath       Subjective     History of Present Illness  Devan Mason is a 59 y.o. male who presents today for follow up after heart cath for history of ischemic CMP and abnormal stress test. Ef 30% at cath, despite medical therapy. First time EF has fallen below 35%, will need to reassess at 90 day gabriela. Discussed ICD, patient is not interested right now and we would have to wait till 90 days. Patient agrees to meet with Ep to discuss further though, but he would want to delay implant for a while.   Wants to do shoulder surgery first because he cannot sleep with it right now. Since he is euvolemic and stable, and heart cath showed no new ischemia I think that is reasonable. Especially since it is affecting quality of life.     Cardiology/sleep history  1.  Coronary artery disease  -Status post coronary artery bypass - LIMA to LAD/D1, SVG to PDA, SVG to OM  -[facility]  -no other disease noted, grafts patent, LAD and % stenosed  -[facility] 2024   All widely patent grafts. Native vessel severe RCA, left main disease with with occluded LAD.  Patent LIMA to LAD Patent SVG to small diagonal  Patent SVG to OM1 and OM 3. Patent SVG to distal RCA LVEF 30%    2.  Hypertension  3.  Hypercholesterolemia  4.  Ischemic cardiomyopathy-EF 45%   -30% at cath 2024  5.  Mild to moderate mitral regurgitation     No Known Allergies    Current Outpatient Medications:     aspirin 81 MG chewable tablet, Chew 1 tablet Daily., Disp: , Rfl:     Cholecalciferol (Vitamin D3) 50 MCG ( UT) capsule, Take 1 capsule by mouth Daily., Disp: , Rfl:     Cinnamon 500 MG tablet, Take 1 tablet by mouth Daily., Disp: , Rfl:     famotidine (PEPCID) 20 MG tablet, Take 1 tablet by mouth Daily. (Patient taking  differently: Take 1 tablet by mouth Daily. Alternates with omeprazole), Disp: 90 tablet, Rfl: 1    metoprolol succinate XL (TOPROL-XL) 25 MG 24 hr tablet, Take 0.5 tablets by mouth Daily. (Patient taking differently: Take 0.5 tablets by mouth Every Night.), Disp: 45 tablet, Rfl: 1    Multiple Vitamins-Minerals (MULTIVITAMIN ADULT, MINERALS, PO), Take 1 tablet by mouth Daily., Disp: , Rfl:     nitroglycerin (NITROSTAT) 0.4 MG SL tablet, 1 under the tongue as needed for angina, may repeat q5mins for up three doses, Disp: 100 tablet, Rfl: 11    Omega-3 Fatty Acids (fish oil) 1000 MG capsule capsule, Take 1 capsule by mouth Daily With Breakfast., Disp: , Rfl:     omeprazole (priLOSEC) 20 MG capsule, Take 1 capsule by mouth Daily. (Patient taking differently: Take 1 capsule by mouth Daily. Alternates with pepcid), Disp: 90 capsule, Rfl: 1    Psyllium (Metamucil) wafer wafer, 2 wafers Daily., Disp: , Rfl:     rosuvastatin (CRESTOR) 40 MG tablet, Take 1 tablet by mouth Daily. (Patient taking differently: Take 1 tablet by mouth Every Night.), Disp: 90 tablet, Rfl: 3    sacubitril-valsartan (ENTRESTO) 24-26 MG tablet, Take 1 tablet by mouth 2 (Two) Times a Day., Disp: , Rfl:     sildenafil (REVATIO) 20 MG tablet, Take 1 tablet by mouth 3 (Three) Times a Day As Needed (erectile dysfunction)., Disp: 30 tablet, Rfl: 11    vitamin B-12 (CYANOCOBALAMIN) 1000 MCG tablet, Take 1 tablet by mouth. 3x per week, Disp: , Rfl:     Potassium 99 MG tablet, Take 1 tablet by mouth Daily. (Patient not taking: Reported on 10/3/2024), Disp: , Rfl:     Past Medical History:   Diagnosis Date    Chronic kidney disease     Coronary artery disease     Hyperlipidemia     Hypertension       Past Surgical History:   Procedure Laterality Date    CARDIAC CATHETERIZATION      CARDIAC CATHETERIZATION N/A 8/30/2024    Procedure: Left Heart Cath;  Surgeon: Nazario Carmichael MD;  Location: [facility] CATH INVASIVE LOCATION;  Service: Cardiovascular;  Laterality:  "N/A;    CARDIAC SURGERY  2017    bypass/ maker 100%, 2 over 90%, 2 over 30%    CORONARY ARTERY BYPASS GRAFT       Family History   Problem Relation Age of Onset    Heart disease Mother     Cancer Mother     Heart disease Brother      Social History     Socioeconomic History    Marital status:    Tobacco Use    Smoking status: Former     Current packs/day: 0.00     Types: Cigarettes     Quit date:      Years since quittin.7     Passive exposure: Never    Smokeless tobacco: Never   Vaping Use    Vaping status: Never Used   Substance and Sexual Activity    Alcohol use: Yes     Comment: occ    Drug use: Never    Sexual activity: Yes     Partners: Female       Objective     Vital Signs:  /78   Pulse 72   Resp 18   Ht 182.9 cm (72\")   Wt 104 kg (229 lb)   BMI 31.06 kg/m²   Estimated body mass index is 31.06 kg/m² as calculated from the following:    Height as of this encounter: 182.9 cm (72\").    Weight as of this encounter: 104 kg (229 lb).         Physical Exam  Constitutional:       Appearance: Normal appearance. He is well-developed.   HENT:      Head: Normocephalic and atraumatic.   Eyes:      General: No scleral icterus.     Pupils: Pupils are equal, round, and reactive to light.   Cardiovascular:      Rate and Rhythm: Normal rate and regular rhythm.      Heart sounds: Normal heart sounds. No murmur heard.  Pulmonary:      Breath sounds: Normal breath sounds. No wheezing or rhonchi.   Musculoskeletal:      Right lower leg: No edema.      Left lower leg: No edema.   Skin:     Capillary Refill: Capillary refill takes less than 2 seconds.      Coloration: Skin is not cyanotic.      Nails: There is no clubbing.   Neurological:      Mental Status: He is alert and oriented to person, place, and time.      Motor: No weakness.      Gait: Gait normal.   Psychiatric:         Mood and Affect: Mood normal.         Behavior: Behavior is cooperative.         Thought Content: Thought content " normal.         Cognition and Memory: Memory normal.                     Assessment and Plan     Diagnoses and all orders for this visit:    1. Pre-operative cardiovascular examination (Primary)  Comments:  OK for shoulder surgery. intermediate risk, optimized from a medical standpoint.    2. Chronic systolic heart failure  Comments:  on medical therapy, patient willing see about getting ICD with EP cardiology. will need a repeat EF in 90 days. EF declined at cath despite medical therapy  Orders:  -     Adult Transthoracic Echo Complete W/ Cont if Necessary Per Protocol; Future  -     Ambulatory Referral to Cardiac Electrophysiology    3. Coronary artery disease involving coronary bypass graft of native heart without angina pectoris  Comments:  cath reviewed, all bypass grafts stable, no indication for PCI, continue prevention and medical managment. No anginal syptoms.              Follow Up  Return in about 3 months (around 1/3/2025) for PM/ICD check.    Mandie Galarza MD   10/03/2024     Please note that this explicitly excludes time spent on other separate billable services such as performing procedures or test interpretation, when applicable.    This note was created using dictation software which occasionally transcribes nonsensical phrases. Please contact the provider if any clarification is needed.

## 2024-10-26 PROBLEM — I25.5 ISCHEMIC CARDIOMYOPATHY: Status: ACTIVE | Noted: 2024-10-26

## 2024-10-26 PROBLEM — E78.2 MIXED HYPERLIPIDEMIA: Status: ACTIVE | Noted: 2024-10-26

## 2024-10-26 PROBLEM — I10 PRIMARY HYPERTENSION: Status: ACTIVE | Noted: 2024-10-26

## 2024-10-26 PROBLEM — I25.10 CAD (CORONARY ARTERY DISEASE): Status: ACTIVE | Noted: 2024-06-27

## 2024-10-26 NOTE — PROGRESS NOTES
"        Cardiac Electrophysiology Outpatient Consult Note            [facility]    Consult Note     Devan Mason  [id number]  10/28/2024  [phone]     Primary Care Physician: Krystle Dickson MD    Referred By: Mandie Galarza MD    Subjective     Chief Complaint:   Diagnoses and all orders for this visit:    1. Ischemic cardiomyopathy (Primary)    2. Primary hypertension    3. At risk for sleep apnea      Chief Complaint   Patient presents with   • Chronic Systolic Heart Failure     NP       History of Present Illness:   Devan Mason is a 59 y.o. male who presents to my electrophysiology clinic for evaluation of ischemic cardiomyopathy.  He has had low EF since 2020.  He had a recent cardiac catheterization showing patent bypass grafts with an LV gram showing EF 30%.  He is scheduled for a 90-day follow-up echocardiogram on December 3, 2024.  He has no current complaint of exertional chest pain or significant dyspnea.  Denies orthopnea PND or lower extremity edema.  His blood pressure at home typically runs less than 110 mmHg systolic.  Has been on Entresto for several years.  States he had a sleep study many years ago which was \"borderline\".  Admits that he snores heavily and has daytime sleepiness.    Past Medical History:   Patient Active Problem List    Diagnosis Date Noted   • Ischemic cardiomyopathy 10/26/2024     Priority: High     Note Last Updated: 10/26/2024     Echo, 6/5/2020: EF 35 to 40%  Echo, 12/18/2023: EF 48%     • Chronic systolic heart failure 06/27/2024     Priority: High   • CAD (coronary artery disease) 06/27/2024     Priority: Medium     Note Last Updated: 10/26/2024     Status post coronary artery bypass -2017 LIMA to LAD/D1, SVG to PDA, SVG to OM  [facility] 2018 -no other disease noted, grafts patent, LAD and % stenosed  [facility] 8-   All widely patent grafts. Native vessel severe RCA, left main disease with with occluded LAD.  Patent " HANDY to LAD Patent SVG to small diagonal  Patent SVG to OM1 and OM 3. Patent SVG to distal RCA LVEF 30%     • Primary hypertension 10/26/2024     Priority: Low   • Mixed hyperlipidemia 10/26/2024       Past Surgical History:   Past Surgical History:   Procedure Laterality Date   • CARDIAC CATHETERIZATION     • CARDIAC CATHETERIZATION N/A 2024    Procedure: Left Heart Cath;  Surgeon: Nazario Carmichael MD;  Location: [facility] CATH INVASIVE LOCATION;  Service: Cardiovascular;  Laterality: N/A;   • CARDIAC SURGERY  2017    bypass/ maker 100%, 2 over 90%, 2 over 30%   • CORONARY ARTERY BYPASS GRAFT     • SHOULDER SURGERY Left 10/09/2024       Social History:   Social History     Socioeconomic History   • Marital status:    Tobacco Use   • Smoking status: Former     Current packs/day: 0.00     Average packs/day: 1.3 packs/day for 41.0 years (51.3 ttl pk-yrs)     Types: Cigarettes     Start date:      Quit date:      Years since quittin.8     Passive exposure: Never   • Smokeless tobacco: Never   • Tobacco comments:     43 years   Vaping Use   • Vaping status: Never Used   Substance and Sexual Activity   • Alcohol use: Yes     Comment: Occasionally   • Drug use: Never   • Sexual activity: Yes     Partners: Female       Medications:     Current Outpatient Medications:   •  aspirin 81 MG chewable tablet, Chew 1 tablet Daily., Disp: , Rfl:   •  Cholecalciferol (Vitamin D3) 50 MCG ( UT) capsule, Take 1 capsule by mouth Daily., Disp: , Rfl:   •  Cinnamon 500 MG tablet, Take 1 tablet by mouth Daily., Disp: , Rfl:   •  famotidine (PEPCID) 20 MG tablet, Take 1 tablet by mouth Daily. (Patient taking differently: Take 1 tablet by mouth Daily. Alternates with omeprazole), Disp: 90 tablet, Rfl: 1  •  metoprolol succinate XL (TOPROL-XL) 25 MG 24 hr tablet, Take 0.5 tablets by mouth Daily. (Patient taking differently: Take 0.5 tablets by mouth Every Night.), Disp: 45 tablet, Rfl: 1  •  Multiple  "Vitamins-Minerals (MULTIVITAMIN ADULT, MINERALS, PO), Take 1 tablet by mouth Daily., Disp: , Rfl:   •  nitroglycerin (NITROSTAT) 0.4 MG SL tablet, 1 under the tongue as needed for angina, may repeat q5mins for up three doses, Disp: 100 tablet, Rfl: 11  •  Omega-3 Fatty Acids (fish oil) 1000 MG capsule capsule, Take 1 capsule by mouth Daily With Breakfast., Disp: , Rfl:   •  omeprazole (priLOSEC) 20 MG capsule, Take 1 capsule by mouth Daily. (Patient taking differently: Take 1 capsule by mouth Daily. Alternates with pepcid), Disp: 90 capsule, Rfl: 1  •  Psyllium (Metamucil) wafer wafer, 2 wafers Daily., Disp: , Rfl:   •  rosuvastatin (CRESTOR) 40 MG tablet, Take 1 tablet by mouth Daily. (Patient taking differently: Take 1 tablet by mouth Every Night.), Disp: 90 tablet, Rfl: 3  •  sacubitril-valsartan (ENTRESTO) 24-26 MG tablet, Take 1 tablet by mouth 2 (Two) Times a Day., Disp: , Rfl:   •  sildenafil (REVATIO) 20 MG tablet, Take 1 tablet by mouth 3 (Three) Times a Day As Needed (erectile dysfunction)., Disp: 30 tablet, Rfl: 11  •  vitamin B-12 (CYANOCOBALAMIN) 1000 MCG tablet, Take 1 tablet by mouth. 3x per week, Disp: , Rfl:     Allergies:   No Known Allergies    Objective   Vital Signs:   Vitals:    10/28/24 0844   BP: 106/74   BP Location: Right arm   Patient Position: Sitting   Cuff Size: Adult   Pulse: 67   SpO2: 95%   Weight: 105 kg (231 lb 6.4 oz)   Height: 182.9 cm (72\")       PHYSICAL EXAM    Neck: no adenopathy, no carotid bruit, no JVD, and thyroid: not enlarged  Lungs: clear to auscultation bilaterally and no rhonchi or crackles\", ' symmetric  Heart: regular rate and rhythm, S1, S2 normal, no murmur, click, rub or gallop  Abdomen: Soft, non-tender, bowel sounds normal,  no organomegaly  Extremities: extremities normal, atraumatic, no cyanosis or edema      Lab Results   Component Value Date    GLUCOSE 93 08/30/2024    CALCIUM 8.9 08/30/2024     08/30/2024    K 4.7 08/30/2024    CO2 24.0 08/30/2024 "     08/30/2024    BUN 14 08/30/2024    CREATININE 1.23 08/30/2024    EGFRIFAFRI 64 10/30/2021    EGFRIFNONA 55 (L) 10/30/2021    BCR 11.4 08/30/2024    ANIONGAP 10.0 08/30/2024     Lab Results   Component Value Date    WBC 9.12 08/30/2024    HGB 16.1 08/30/2024    HCT 47.4 08/30/2024    MCV 90.8 08/30/2024     08/30/2024       Cardiac Testing:      I personally viewed and interpreted the patient's EKG/Telemetry/lab data      ECG 12 Lead    Date/Time: 10/28/2024 9:34 AM  Performed by: Devan Coronel PA    Authorized by: Devan Coronel PA  Comparison: not compared with previous ECG   Rhythm: sinus rhythm  Rate: normal  BPM: 67  Conduction: conduction normal  ST Segments: ST segments normal  T inversion: V5 and V6  QRS axis: normal  Other findings: non-specific ST-T wave changes    Clinical impression: abnormal EKG  Comments: QRS 98 ms      Tobacco Cessation: N/A  Obstructive Sleep Apnea Screening: Heavy snorer with daytime sleepiness    Assessment & Plan    Diagnoses and all orders for this visit:    1. Ischemic cardiomyopathy (Primary)    2. Primary hypertension    3. At risk for sleep apnea         Diagnosis Plan   1. Ischemic cardiomyopathy  At present he does not meet criteria for an ICD by strict criteria.  He will let us know as soon as he gets his 90-day follow-up echocardiogram and we will reassess.  He has a narrow complex rhythm on EKG.  NYHA class II CHF symptoms, well-managed on current medical regimen I have given him literature describing ICD's and optimizer's for future reference.  We will schedule him for follow-up with our service in 3 months.      2. Primary hypertension  Well-managed, continue current medical regimen      3. At risk for sleep apnea  Consider repeat sleep study        Body mass index is 31.38 kg/m².      Follow Up:       Thank you for allowing me to participate in the care of your patient. Please to not hesitate to contact me with additional questions or  concerns.     Electronically signed by BECCA Sanchez, 10/28/24, 9:34 AM EDT.          Electronically signed by BECCA Sanchez, 10/26/24, 11:51 AM EDT.

## 2024-10-28 ENCOUNTER — PATIENT ROUNDING (BHMG ONLY) (OUTPATIENT)
Dept: CARDIOLOGY | Facility: CLINIC | Age: 59
End: 2024-10-28
Payer: COMMERCIAL

## 2024-10-28 ENCOUNTER — OFFICE VISIT (OUTPATIENT)
Dept: CARDIOLOGY | Facility: CLINIC | Age: 59
End: 2024-10-28
Payer: COMMERCIAL

## 2024-10-28 VITALS
HEART RATE: 67 BPM | HEIGHT: 72 IN | WEIGHT: 231.4 LBS | OXYGEN SATURATION: 95 % | BODY MASS INDEX: 31.34 KG/M2 | DIASTOLIC BLOOD PRESSURE: 74 MMHG | SYSTOLIC BLOOD PRESSURE: 106 MMHG

## 2024-10-28 DIAGNOSIS — I10 PRIMARY HYPERTENSION: ICD-10-CM

## 2024-10-28 DIAGNOSIS — Z91.89 AT RISK FOR SLEEP APNEA: ICD-10-CM

## 2024-10-28 DIAGNOSIS — I25.5 ISCHEMIC CARDIOMYOPATHY: Primary | ICD-10-CM

## 2024-10-28 PROCEDURE — 99213 OFFICE O/P EST LOW 20 MIN: CPT | Performed by: PHYSICIAN ASSISTANT

## 2024-10-28 PROCEDURE — 93000 ELECTROCARDIOGRAM COMPLETE: CPT | Performed by: PHYSICIAN ASSISTANT

## 2024-10-28 NOTE — PROGRESS NOTES
"October 28, 2024    Hello, may I speak with Devan Mason? Yes.    My name is Charisse SUTTON      I am  with [facility]ARDIOLOG[facility]  [address]  [place] [address] [phone].    Before we get started may I verify your date of birth? 1965, Yes, correct.    I am calling to officially welcome you to our practice and ask about your recent visit. Is this a good time to talk? yes    Tell me about your visit with us. What things went well? \"Nothing.\"       We're always looking for ways to make our patients' experiences even better. Do you have recommendations on ways we may improve?  no    Overall were you satisfied with your first visit to our practice? Yes & No.        I appreciate you taking the time to speak with me today. Is there anything else I can do for you? no      Thank you, and have a great day.      "

## 2024-12-02 RX ORDER — METOPROLOL SUCCINATE 25 MG/1
12.5 TABLET, EXTENDED RELEASE ORAL DAILY
Qty: 45 TABLET | Refills: 1 | Status: SHIPPED | OUTPATIENT
Start: 2024-12-02

## 2024-12-03 ENCOUNTER — HOSPITAL ENCOUNTER (OUTPATIENT)
Facility: HOSPITAL | Age: 59
Discharge: HOME | End: 2024-12-03
Admitting: INTERNAL MEDICINE
Payer: COMMERCIAL

## 2024-12-03 ENCOUNTER — OFFICE VISIT (OUTPATIENT)
Age: 59
End: 2024-12-03
Payer: COMMERCIAL

## 2024-12-03 VITALS — BODY MASS INDEX: 31.29 KG/M2 | WEIGHT: 231 LBS | HEIGHT: 72 IN

## 2024-12-03 VITALS
DIASTOLIC BLOOD PRESSURE: 68 MMHG | HEIGHT: 72 IN | OXYGEN SATURATION: 97 % | SYSTOLIC BLOOD PRESSURE: 120 MMHG | HEART RATE: 71 BPM | WEIGHT: 229 LBS | BODY MASS INDEX: 31.02 KG/M2

## 2024-12-03 DIAGNOSIS — I50.22 CHRONIC SYSTOLIC HEART FAILURE: Primary | ICD-10-CM

## 2024-12-03 DIAGNOSIS — I25.810 CORONARY ARTERY DISEASE INVOLVING CORONARY BYPASS GRAFT OF NATIVE HEART WITHOUT ANGINA PECTORIS: ICD-10-CM

## 2024-12-03 DIAGNOSIS — Z02.89 ENCOUNTER FOR OCCUPATIONAL PHYSICAL EXAMINATION: ICD-10-CM

## 2024-12-03 DIAGNOSIS — I50.22 CHRONIC SYSTOLIC HEART FAILURE: ICD-10-CM

## 2024-12-03 LAB
ASCENDING AORTA: 3.5 CM
BH CV ECHO MEAS - AO MAX PG: 8.8 MMHG
BH CV ECHO MEAS - AO MEAN PG: 4.7 MMHG
BH CV ECHO MEAS - AO ROOT DIAM: 3.4 CM
BH CV ECHO MEAS - AO V2 MAX: 148.3 CM/SEC
BH CV ECHO MEAS - AO V2 VTI: 24.4 CM
BH CV ECHO MEAS - AVA(I,D): 1.88 CM2
BH CV ECHO MEAS - EDV(CUBED): 250 ML
BH CV ECHO MEAS - EDV(MOD-SP2): 135 ML
BH CV ECHO MEAS - EDV(MOD-SP4): 187 ML
BH CV ECHO MEAS - EF(MOD-BP): 35 %
BH CV ECHO MEAS - EF(MOD-SP2): 49 %
BH CV ECHO MEAS - EF(MOD-SP4): 44.9 %
BH CV ECHO MEAS - ESV(CUBED): 140.6 ML
BH CV ECHO MEAS - ESV(MOD-SP2): 68.8 ML
BH CV ECHO MEAS - ESV(MOD-SP4): 103 ML
BH CV ECHO MEAS - FS: 17.5 %
BH CV ECHO MEAS - IVS/LVPW: 0.73 CM
BH CV ECHO MEAS - IVSD: 0.8 CM
BH CV ECHO MEAS - LA DIMENSION: 3.7 CM
BH CV ECHO MEAS - LAT PEAK E' VEL: 7.3 CM/SEC
BH CV ECHO MEAS - LV DIASTOLIC VOL/BSA (35-75): 82.9 CM2
BH CV ECHO MEAS - LV MASS(C)D: 251.3 GRAMS
BH CV ECHO MEAS - LV MAX PG: 2 MMHG
BH CV ECHO MEAS - LV MEAN PG: 1 MMHG
BH CV ECHO MEAS - LV SYSTOLIC VOL/BSA (12-30): 45.6 CM2
BH CV ECHO MEAS - LV V1 MAX: 70.7 CM/SEC
BH CV ECHO MEAS - LV V1 VTI: 13.3 CM
BH CV ECHO MEAS - LVIDD: 6.3 CM
BH CV ECHO MEAS - LVIDS: 5.2 CM
BH CV ECHO MEAS - LVOT AREA: 3.5 CM2
BH CV ECHO MEAS - LVOT DIAM: 2.1 CM
BH CV ECHO MEAS - LVPWD: 1.1 CM
BH CV ECHO MEAS - MED PEAK E' VEL: 8.1 CM/SEC
BH CV ECHO MEAS - MV A MAX VEL: 59.5 CM/SEC
BH CV ECHO MEAS - MV DEC SLOPE: 428 CM/SEC2
BH CV ECHO MEAS - MV DEC TIME: 0.18 SEC
BH CV ECHO MEAS - MV E MAX VEL: 75.5 CM/SEC
BH CV ECHO MEAS - MV E/A: 1.27
BH CV ECHO MEAS - MV MAX PG: 2.4 MMHG
BH CV ECHO MEAS - MV MEAN PG: 1 MMHG
BH CV ECHO MEAS - MV V2 VTI: 24.2 CM
BH CV ECHO MEAS - MVA(VTI): 1.9 CM2
BH CV ECHO MEAS - PA ACC TIME: 0.09 SEC
BH CV ECHO MEAS - PA V2 MAX: 172 CM/SEC
BH CV ECHO MEAS - SV(LVOT): 46 ML
BH CV ECHO MEAS - SV(MOD-SP2): 66.2 ML
BH CV ECHO MEAS - SV(MOD-SP4): 84 ML
BH CV ECHO MEAS - SVI(LVOT): 20.4 ML/M2
BH CV ECHO MEAS - SVI(MOD-SP2): 29.3 ML/M2
BH CV ECHO MEAS - SVI(MOD-SP4): 37.2 ML/M2
BH CV ECHO MEAS - TAPSE (>1.6): 1.56 CM
BH CV ECHO MEASUREMENTS AVERAGE E/E' RATIO: 9.81
BH CV VAS BP LEFT ARM: NORMAL MMHG
BH CV XLRA - RV BASE: 3.8 CM
BH CV XLRA - RV LENGTH: 8.5 CM
BH CV XLRA - RV MID: 2.9 CM
BH CV XLRA - TDI S': 12.8 CM/SEC
IVRT: 95 MS
LEFT ATRIUM VOLUME INDEX: 29.5 ML/M2

## 2024-12-03 PROCEDURE — 93306 TTE W/DOPPLER COMPLETE: CPT

## 2024-12-03 PROCEDURE — 25010000002 SULFUR HEXAFLUORIDE MICROSPH 60.7-25 MG RECONSTITUTED SUSPENSION: Performed by: INTERNAL MEDICINE

## 2024-12-03 PROCEDURE — 93306 TTE W/DOPPLER COMPLETE: CPT | Performed by: INTERNAL MEDICINE

## 2024-12-03 RX ADMIN — SULFUR HEXAFLUORIDE 2 ML: KIT at 09:40

## 2024-12-03 NOTE — PROGRESS NOTES
[facility] Provider Note     Date:   2024   Name: Devan Mason  :   1965  PCP: Krystle Dickson MD    Chief Complaint   Patient presents with    Coronary Artery Disease     Echo results    Hypertension       Subjective     History of Present Illness  Devan Mason is a 59 y.o. male who presents today for ECHO results and follow-up on whether or not he needs defibrillator for ischemic cardiomyopathy. Has been to see Dr Song. Has questions about what we are going to do from here.  Has a little chest pain every now and then. Sometimes seconds to minutes.Slight steady pain.  No swelling.  Has had shoulder surgery without problems. Doing PT.  Reports some dizziness but nothing unusual for him. No syncope.  Has had shortness of breath. Notes that it is more than usual.  B/P been doing well.    Cardiology/sleep history  1.  Coronary artery disease  -Status post coronary artery bypass - LIMA to LAD/D1, SVG to PDA, SVG to OM  -[facility] 2018 -no other disease noted, grafts patent, LAD and % stenosed  -[facility] 2024   All widely patent grafts. Native vessel severe RCA, left main disease with with occluded LAD.  Patent LIMA to LAD Patent SVG to small diagonal  Patent SVG to OM1 and OM 3. Patent SVG to distal RCA LVEF 30%    2.  Hypertension  3.  Hypercholesterolemia  4.  Ischemic cardiomyopathy-EF 45%   -30% at cath 2024  5.  Mild to moderate mitral regurgitation     No Known Allergies    Current Outpatient Medications:     aspirin 81 MG chewable tablet, Chew 1 tablet Daily., Disp: , Rfl:     Cholecalciferol (Vitamin D3) 50 MCG (2000) capsule, Take 1 capsule by mouth Daily., Disp: , Rfl:     Cinnamon 500 MG tablet, Take 1 tablet by mouth Daily., Disp: , Rfl:     famotidine (PEPCID) 20 MG tablet, Take 1 tablet by mouth Daily. (Patient taking differently: Take 1 tablet by mouth Daily. Alternates with omeprazole), Disp: 90 tablet, Rfl: 1    metoprolol  Problem: GI Endoscopy (Adult)  Goal: Signs and Symptoms of Listed Potential Problems Will be Absent or Manageable (GI Endoscopy)  Outcome: Ongoing (interventions implemented as appropriate)       succinate XL (TOPROL-XL) 25 MG 24 hr tablet, Take 0.5 tablets by mouth Daily., Disp: 45 tablet, Rfl: 1    Multiple Vitamins-Minerals (MULTIVITAMIN ADULT, MINERALS, PO), Take 1 tablet by mouth Daily., Disp: , Rfl:     nitroglycerin (NITROSTAT) 0.4 MG SL tablet, 1 under the tongue as needed for angina, may repeat q5mins for up three doses, Disp: 100 tablet, Rfl: 11    Omega-3 Fatty Acids (fish oil) 1000 MG capsule capsule, Take 1 capsule by mouth Daily With Breakfast., Disp: , Rfl:     omeprazole (priLOSEC) 20 MG capsule, Take 1 capsule by mouth Daily. (Patient taking differently: Take 1 capsule by mouth Daily. Alternates with pepcid), Disp: 90 capsule, Rfl: 1    Psyllium (Metamucil) wafer wafer, 2 wafers Daily., Disp: , Rfl:     rosuvastatin (CRESTOR) 40 MG tablet, Take 1 tablet by mouth Daily. (Patient taking differently: Take 1 tablet by mouth Every Night.), Disp: 90 tablet, Rfl: 3    sacubitril-valsartan (ENTRESTO) 24-26 MG tablet, Take 1 tablet by mouth 2 (Two) Times a Day., Disp: , Rfl:     sildenafil (REVATIO) 20 MG tablet, Take 1 tablet by mouth 3 (Three) Times a Day As Needed (erectile dysfunction)., Disp: 30 tablet, Rfl: 11    vitamin B-12 (CYANOCOBALAMIN) 1000 MCG tablet, Take 1 tablet by mouth. 3x per week, Disp: , Rfl:     Past Medical History:   Diagnosis Date    Chronic kidney disease     Coronary artery disease     Heart valve disease 2019    Leaky valve    Hyperlipidemia     Hypertension     Myocardial infarction 1/2017      Past Surgical History:   Procedure Laterality Date    CARDIAC CATHETERIZATION      CARDIAC CATHETERIZATION N/A 08/30/2024    Procedure: Left Heart Cath;  Surgeon: Nazario Carmichael MD;  Location: [facility] CATH INVASIVE LOCATION;  Service: Cardiovascular;  Laterality: N/A;    CARDIAC SURGERY  01/30/2017    bypass/ maker 100%, 2 over 90%, 2 over 30%    CORONARY ARTERY BYPASS GRAFT      SHOULDER SURGERY Left 10/09/2024     Family History   Problem Relation Age of Onset    Heart  "disease Mother     Cancer Mother     Heart attack Mother     Heart disease Brother      Social History     Socioeconomic History    Marital status:    Tobacco Use    Smoking status: Former     Current packs/day: 0.00     Average packs/day: 1.3 packs/day for 41.0 years (51.3 ttl pk-yrs)     Types: Cigarettes     Start date:      Quit date: 2017     Years since quittin.9     Passive exposure: Never    Smokeless tobacco: Never    Tobacco comments:     43 years   Vaping Use    Vaping status: Never Used   Substance and Sexual Activity    Alcohol use: Yes     Comment: Occasionally    Drug use: Never    Sexual activity: Yes     Partners: Female       Objective     Vital Signs:  /68   Pulse 71   Ht 182.9 cm (72\")   Wt 104 kg (229 lb)   SpO2 97%   BMI 31.06 kg/m²   Estimated body mass index is 31.06 kg/m² as calculated from the following:    Height as of this encounter: 182.9 cm (72\").    Weight as of this encounter: 104 kg (229 lb).         Physical Exam  Constitutional:       Appearance: Normal appearance. He is well-developed.   HENT:      Head: Normocephalic and atraumatic.   Eyes:      General: No scleral icterus.     Pupils: Pupils are equal, round, and reactive to light.   Cardiovascular:      Rate and Rhythm: Normal rate and regular rhythm.      Heart sounds: Normal heart sounds. No murmur heard.  Pulmonary:      Breath sounds: Normal breath sounds. No wheezing or rhonchi.   Musculoskeletal:      Right lower leg: No edema.      Left lower leg: No edema.   Skin:     Capillary Refill: Capillary refill takes less than 2 seconds.      Coloration: Skin is not cyanotic.      Nails: There is no clubbing.   Neurological:      Mental Status: He is alert and oriented to person, place, and time.      Motor: No weakness.      Gait: Gait normal.   Psychiatric:         Mood and Affect: Mood normal.         Behavior: Behavior is cooperative.         Thought Content: Thought content normal.         " Cognition and Memory: Memory normal.           Consultant notes reviewed: Notes from Dr Valentin's PA reviewed          Assessment and Plan     Assessment & Plan  Chronic systolic heart failure  On medical therapy and has not improved after 90 days.  Patient has a defibrillator candidate.  Spent some time answering questions about benefit and risks.  Also about long-term prognosis of this.  Currently he has maintain euvolemic status and has not had any recent hospitalizations.  Expect that he will remain stable on medical therapy for several years after that it is difficult to say.       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  Will contact DOT for what paper note needs to be filled out.  Patient is aware and will not drive in the meantime.  He is off work from a recent shoulder surgery.       Coronary artery disease involving coronary bypass graft of native heart without angina pectoris  Recent heart cath reviewed.  No disease intervened on.  Nothing amenable for intervention.  Continue medical therapy.  Asymptomatic at this time.                  I spent 35 minutes caring for Devan on this date of service. This time includes time spent by me in the following activities:counseling and educating the patient/family/caregiver, referring and communicating with other health care professionals , documenting information in the medical record, and care coordination    Follow Up  Return for Post op wound check.    Mandie Galarza MD   12/03/2024     Please note that this explicitly excludes time spent on other separate billable services such as performing procedures or test interpretation, when applicable.    This note was created using dictation software which occasionally transcribes nonsensical phrases. Please contact the provider if any clarification is needed.

## 2024-12-04 NOTE — ASSESSMENT & PLAN NOTE
Recent heart cath reviewed.  No disease intervened on.  Nothing amenable for intervention.  Continue medical therapy.  Asymptomatic at this time.

## 2024-12-04 NOTE — ASSESSMENT & PLAN NOTE
On medical therapy and has not improved after 90 days.  Patient has a defibrillator candidate.  Spent some time answering questions about benefit and risks.  Also about long-term prognosis of this.  Currently he has maintain euvolemic status and has not had any recent hospitalizations.  Expect that he will remain stable on medical therapy for several years after that it is difficult to say.

## 2024-12-10 ENCOUNTER — TELEPHONE (OUTPATIENT)
Dept: CARDIOLOGY | Facility: CLINIC | Age: 59
End: 2024-12-10
Payer: COMMERCIAL

## 2024-12-10 ENCOUNTER — PATIENT MESSAGE (OUTPATIENT)
Age: 59
End: 2024-12-10
Payer: COMMERCIAL

## 2024-12-10 DIAGNOSIS — I25.5 ISCHEMIC CARDIOMYOPATHY: Primary | ICD-10-CM

## 2024-12-10 DIAGNOSIS — I50.22 CHRONIC SYSTOLIC HEART FAILURE: ICD-10-CM

## 2024-12-10 NOTE — TELEPHONE ENCOUNTER
Verified with Dr Valentin's team that procedure for defibrillator will be on 12-20. Appointment made for follow up with Dr. Galarza for 12-26 at 12:30 PM at the [place] location. Called pt. And verified appointment time. Pt verbalized understanding and has no questions voiced.

## 2024-12-10 NOTE — TELEPHONE ENCOUNTER
DR VEGA'S OFFICE CALLED AND STATED THAT THEY ARE NEEDING AN EP CASE REQUEST/ORDERED ENTERED ON PATIENT, SO THEY CAN SCHEDULE HIM.     PATIENT HAS CALLED DR. VEGA'S OFFICE AND CALLED OUR OFFICE NUMEROUS TIMES AND IS VERY UPSET THAT HE HAS NOT BEEN SCHEDULED YET.

## 2024-12-10 NOTE — TELEPHONE ENCOUNTER
Dr. Valentin's office said that this referral will not allow them to schedule him for procedure. They are needing case request for them to schedule.

## 2024-12-10 NOTE — TELEPHONE ENCOUNTER
PT called and requested a call back as soon as possible.  PT stated that he has not been able to schedule his surgery because surgeon says they don't have orders, PT is concerned and is asking for help..

## 2024-12-16 ENCOUNTER — PRE-ADMISSION TESTING (OUTPATIENT)
Dept: PREADMISSION TESTING | Facility: HOSPITAL | Age: 59
End: 2024-12-16
Payer: COMMERCIAL

## 2024-12-16 LAB
ANION GAP SERPL CALCULATED.3IONS-SCNC: 11 MMOL/L (ref 5–15)
BUN SERPL-MCNC: 15 MG/DL (ref 8–23)
BUN/CREAT SERPL: 13.5 (ref 7–25)
CALCIUM SPEC-SCNC: 9.7 MG/DL (ref 8.6–10.5)
CHLORIDE SERPL-SCNC: 105 MMOL/L (ref 98–107)
CO2 SERPL-SCNC: 25 MMOL/L (ref 22–29)
CREAT SERPL-MCNC: 1.11 MG/DL (ref 0.76–1.27)
DEPRECATED RDW RBC AUTO: 41.8 FL (ref 37–54)
EGFRCR SERPLBLD CKD-EPI 2021: 75.5 ML/MIN/1.73
ERYTHROCYTE [DISTWIDTH] IN BLOOD BY AUTOMATED COUNT: 12.7 % (ref 12.3–15.4)
GLUCOSE SERPL-MCNC: 94 MG/DL (ref 65–99)
HCT VFR BLD AUTO: 46 % (ref 37.5–51)
HGB BLD-MCNC: 15.7 G/DL (ref 13–17.7)
MCH RBC QN AUTO: 31 PG (ref 26.6–33)
MCHC RBC AUTO-ENTMCNC: 34.1 G/DL (ref 31.5–35.7)
MCV RBC AUTO: 90.7 FL (ref 79–97)
PLATELET # BLD AUTO: 191 10*3/MM3 (ref 140–450)
PMV BLD AUTO: 9.4 FL (ref 6–12)
POTASSIUM SERPL-SCNC: 4.4 MMOL/L (ref 3.5–5.2)
RBC # BLD AUTO: 5.07 10*6/MM3 (ref 4.14–5.8)
SODIUM SERPL-SCNC: 141 MMOL/L (ref 136–145)
WBC NRBC COR # BLD AUTO: 9.12 10*3/MM3 (ref 3.4–10.8)

## 2024-12-16 PROCEDURE — 80048 BASIC METABOLIC PNL TOTAL CA: CPT

## 2024-12-16 PROCEDURE — 85027 COMPLETE CBC AUTOMATED: CPT

## 2024-12-16 PROCEDURE — 36415 COLL VENOUS BLD VENIPUNCTURE: CPT

## 2024-12-16 NOTE — PAT
An arrival time for procedure was not provided during PAT visit. If patient had any questions or concerns about their arrival time, they were instructed to contact their surgeon/physician.  Additionally, if the patient referred to an arrival time that was acquired from their my chart account, patient was encouraged to verify that time with their surgeon/physician. Arrival times are NOT provided in Pre Admission Testing Department.    Patient denies any current skin issues.     Patient to apply Chlorhexadine wipes  to surgical area (as instructed) the night before procedure and the AM of procedure. Wipes provided.    Patient viewed general PAT education video as instructed in their preoperative information received from their surgeon.  Patient stated the general PAT education video was viewed in its entirety and survey completed.  Copies of PAT general education handouts (Incentive Spirometry, Meds to Beds Program, Patient Belongings, Pre-op skin preparation instructions, Blood Glucose testing, Visitor policy, Surgery FAQ, Code H) distributed to patient if not printed. Education related to the PAT pass and skin preparation for surgery (if applicable) completed in PAT as a reinforcement to PAT education video. Patient instructed to return PAT pass provided today as well as completed skin preparation sheet (if applicable) on the day of procedure.     Additionally if patient had not viewed video yet but intended to view it at home or in our waiting area, then referred them to the handout with QR code/link provided during PAT visit.  Encouraged patient/family to read PAT general education handouts thoroughly and notify PAT staff with any questions or concerns. Patient verbalized understanding of all information and priority content.    Incorrect order or no order for procedure consent.  Please obtain procedure consent on the day of procedure.

## 2024-12-20 ENCOUNTER — APPOINTMENT (OUTPATIENT)
Dept: GENERAL RADIOLOGY | Facility: HOSPITAL | Age: 59
End: 2024-12-20
Payer: COMMERCIAL

## 2024-12-20 ENCOUNTER — HOSPITAL ENCOUNTER (OUTPATIENT)
Facility: HOSPITAL | Age: 59
Discharge: HOME | End: 2024-12-20
Attending: INTERNAL MEDICINE | Admitting: INTERNAL MEDICINE
Payer: COMMERCIAL

## 2024-12-20 ENCOUNTER — ANESTHESIA EVENT CONVERTED (OUTPATIENT)
Dept: ANESTHESIOLOGY | Facility: HOSPITAL | Age: 59
End: 2024-12-20
Payer: COMMERCIAL

## 2024-12-20 ENCOUNTER — ANESTHESIA (OUTPATIENT)
Dept: CARDIOLOGY | Facility: HOSPITAL | Age: 59
End: 2024-12-20
Payer: COMMERCIAL

## 2024-12-20 ENCOUNTER — ANESTHESIA EVENT (OUTPATIENT)
Dept: CARDIOLOGY | Facility: HOSPITAL | Age: 59
End: 2024-12-20
Payer: COMMERCIAL

## 2024-12-20 VITALS
OXYGEN SATURATION: 97 % | HEART RATE: 72 BPM | SYSTOLIC BLOOD PRESSURE: 137 MMHG | DIASTOLIC BLOOD PRESSURE: 82 MMHG | RESPIRATION RATE: 9 BRPM

## 2024-12-20 DIAGNOSIS — I25.5 ISCHEMIC CARDIOMYOPATHY: ICD-10-CM

## 2024-12-20 DIAGNOSIS — I50.22 CHRONIC SYSTOLIC HEART FAILURE: ICD-10-CM

## 2024-12-20 PROBLEM — I50.20 HFREF (HEART FAILURE WITH REDUCED EJECTION FRACTION): Status: ACTIVE | Noted: 2024-12-20

## 2024-12-20 PROCEDURE — 33270 INS/REP SUBQ DEFIBRILLATOR: CPT | Performed by: INTERNAL MEDICINE

## 2024-12-20 PROCEDURE — 25010000002 FENTANYL CITRATE (PF) 50 MCG/ML SOLUTION: Performed by: INTERNAL MEDICINE

## 2024-12-20 PROCEDURE — 25010000002 MIDAZOLAM PER 1 MG: Performed by: INTERNAL MEDICINE

## 2024-12-20 PROCEDURE — 25010000002 ONDANSETRON PER 1 MG: Performed by: INTERNAL MEDICINE

## 2024-12-20 PROCEDURE — 25010000002 CEFAZOLIN PER 500 MG

## 2024-12-20 PROCEDURE — C1722 AICD, SINGLE CHAMBER: HCPCS | Performed by: INTERNAL MEDICINE

## 2024-12-20 PROCEDURE — 99152 MOD SED SAME PHYS/QHP 5/>YRS: CPT | Performed by: INTERNAL MEDICINE

## 2024-12-20 PROCEDURE — 99153 MOD SED SAME PHYS/QHP EA: CPT | Performed by: INTERNAL MEDICINE

## 2024-12-20 PROCEDURE — 25010000002 LIDOCAINE 1 % SOLUTION: Performed by: INTERNAL MEDICINE

## 2024-12-20 PROCEDURE — C1896 LEAD, AICD, NON SING/DUAL: HCPCS | Performed by: INTERNAL MEDICINE

## 2024-12-20 PROCEDURE — 71046 X-RAY EXAM CHEST 2 VIEWS: CPT

## 2024-12-20 DEVICE — SUBCUTANEOUS ELECTRODE
Type: IMPLANTABLE DEVICE | Site: CHEST WALL | Status: FUNCTIONAL
Brand: EMBLEM™ S-ICD

## 2024-12-20 DEVICE — SUBCUTANEOUS IMPLANTABLE CARDIOVERTER DEFIBRILLATOR
Type: IMPLANTABLE DEVICE | Site: CHEST WALL | Status: FUNCTIONAL
Brand: EMBLEM™ MRI S-ICD

## 2024-12-20 RX ORDER — NITROGLYCERIN 0.4 MG/1
0.4 TABLET SUBLINGUAL
Status: DISCONTINUED | OUTPATIENT
Start: 2024-12-20 | End: 2024-12-20 | Stop reason: HOSPADM

## 2024-12-20 RX ORDER — ETOMIDATE 2 MG/ML
INJECTION INTRAVENOUS
Status: DISCONTINUED | OUTPATIENT
Start: 2024-12-20 | End: 2024-12-20 | Stop reason: HOSPADM

## 2024-12-20 RX ORDER — ONDANSETRON 2 MG/ML
INJECTION INTRAMUSCULAR; INTRAVENOUS
Status: DISCONTINUED | OUTPATIENT
Start: 2024-12-20 | End: 2024-12-20 | Stop reason: HOSPADM

## 2024-12-20 RX ORDER — MIDAZOLAM HYDROCHLORIDE 1 MG/ML
INJECTION, SOLUTION INTRAMUSCULAR; INTRAVENOUS
Status: DISCONTINUED | OUTPATIENT
Start: 2024-12-20 | End: 2024-12-20 | Stop reason: HOSPADM

## 2024-12-20 RX ORDER — ACETAMINOPHEN 325 MG/1
650 TABLET ORAL EVERY 4 HOURS PRN
Status: DISCONTINUED | OUTPATIENT
Start: 2024-12-20 | End: 2024-12-20 | Stop reason: HOSPADM

## 2024-12-20 RX ORDER — LIDOCAINE HYDROCHLORIDE 10 MG/ML
INJECTION, SOLUTION INFILTRATION; PERINEURAL
Status: DISCONTINUED | OUTPATIENT
Start: 2024-12-20 | End: 2024-12-20 | Stop reason: HOSPADM

## 2024-12-20 RX ORDER — SODIUM CHLORIDE 0.9 % (FLUSH) 0.9 %
10 SYRINGE (ML) INJECTION AS NEEDED
Status: DISCONTINUED | OUTPATIENT
Start: 2024-12-20 | End: 2024-12-20 | Stop reason: HOSPADM

## 2024-12-20 RX ORDER — SODIUM CHLORIDE 9 MG/ML
40 INJECTION, SOLUTION INTRAVENOUS AS NEEDED
Status: DISCONTINUED | OUTPATIENT
Start: 2024-12-20 | End: 2024-12-20 | Stop reason: HOSPADM

## 2024-12-20 RX ORDER — FENTANYL CITRATE 50 UG/ML
INJECTION, SOLUTION INTRAMUSCULAR; INTRAVENOUS
Status: COMPLETED | OUTPATIENT
Start: 2024-12-20 | End: 2024-12-20

## 2024-12-20 RX ORDER — DEXAMETHASONE SODIUM PHOSPHATE 10 MG/ML
INJECTION, SOLUTION INTRAMUSCULAR; INTRAVENOUS
Status: COMPLETED | OUTPATIENT
Start: 2024-12-20 | End: 2024-12-20

## 2024-12-20 RX ORDER — SODIUM CHLORIDE 0.9 % (FLUSH) 0.9 %
3 SYRINGE (ML) INJECTION EVERY 12 HOURS SCHEDULED
Status: DISCONTINUED | OUTPATIENT
Start: 2024-12-20 | End: 2024-12-20 | Stop reason: HOSPADM

## 2024-12-20 RX ORDER — BUPIVACAINE HYDROCHLORIDE 2.5 MG/ML
INJECTION, SOLUTION EPIDURAL; INFILTRATION; INTRACAUDAL
Status: COMPLETED | OUTPATIENT
Start: 2024-12-20 | End: 2024-12-20

## 2024-12-20 RX ORDER — ROPIVACAINE HYDROCHLORIDE 5 MG/ML
INJECTION, SOLUTION EPIDURAL; INFILTRATION; PERINEURAL
Status: COMPLETED | OUTPATIENT
Start: 2024-12-20 | End: 2024-12-20

## 2024-12-20 RX ORDER — FENTANYL CITRATE 50 UG/ML
INJECTION, SOLUTION INTRAMUSCULAR; INTRAVENOUS
Status: DISCONTINUED | OUTPATIENT
Start: 2024-12-20 | End: 2024-12-20 | Stop reason: HOSPADM

## 2024-12-20 RX ORDER — ONDANSETRON 2 MG/ML
4 INJECTION INTRAMUSCULAR; INTRAVENOUS EVERY 6 HOURS PRN
Status: DISCONTINUED | OUTPATIENT
Start: 2024-12-20 | End: 2024-12-20 | Stop reason: HOSPADM

## 2024-12-20 RX ADMIN — FENTANYL CITRATE 100 MCG: 50 INJECTION, SOLUTION INTRAMUSCULAR; INTRAVENOUS at 11:52

## 2024-12-20 RX ADMIN — SODIUM CHLORIDE 2 G: 900 INJECTION INTRAVENOUS at 14:44

## 2024-12-20 RX ADMIN — ROPIVACAINE HYDROCHLORIDE 30 ML: 5 INJECTION, SOLUTION EPIDURAL; INFILTRATION; PERINEURAL at 12:01

## 2024-12-20 RX ADMIN — DEXAMETHASONE SODIUM PHOSPHATE 2 MG: 10 INJECTION, SOLUTION INTRAMUSCULAR; INTRAVENOUS at 12:01

## 2024-12-20 RX ADMIN — BUPIVACAINE HYDROCHLORIDE 30 ML: 2.5 INJECTION, SOLUTION EPIDURAL; INFILTRATION; INTRACAUDAL at 11:52

## 2024-12-20 RX ADMIN — DEXAMETHASONE SODIUM PHOSPHATE 2 MG: 10 INJECTION, SOLUTION INTRAMUSCULAR; INTRAVENOUS at 11:52

## 2024-12-20 NOTE — DISCHARGE INSTR - ACTIVITY
"FOR THE PATIENT           1) We ask you the patient to to leave the wound open to the air.  The glue is the bandage.     2) We ask you to not shower or get the wounds wet for 2 days only.  After this you may shower but we ask that you not scrub or attempt to \"clean\" your surgery wounds.     3) Patients may experience some discomfort especially when performing side bending or twisting of the torso.  This is normal.  Mild to moderate pain is normal and is to be expected.  While there is no explicit recommendation to not lift any heavy objects or raise the left arm above a certain level, doing so may result in some discomfort.  The discomfort should be limiting factor in performing these maneuvers.  In other words, discomfort should limit your activity.  It is not our goal to completely eliminate your pain as this may lead to overactivity, poor wound healing and worse outcomes.  We prefer not to prescribe narcotics for this reason.     4) We ask that you call our office at  with any questions, 24 hours a day and specifically ask for the EP doctor on call with any questions about the device or the wounds.     5) We ask that you follow up with our EP Service Nurse Practitioners in approximately one week and then with me in 3 months.  "

## 2024-12-20 NOTE — ANESTHESIA PROCEDURE NOTES
Serratous Midaxillary      Patient location during procedure: holding area  Start time: 12/20/2024 12:01 PM  Reason for block: at surgeon's request  Performed by  CRNA/CAA: Uzair Mccartney CRNA  Assisted by: Bushra Giles RN  Preanesthetic Checklist  Completed: patient identified, IV checked, site marked, risks and benefits discussed, surgical consent, monitors and equipment checked, pre-op evaluation and timeout performed  Prep:  Pt Position: sitting  Sterile barriers:cap, gloves and sterile barriers  Prep: ChloraPrep  Patient monitoring: blood pressure monitoring, continuous pulse oximetry and EKG  Procedure    Sedation: no  Performed under: local infiltration  Guidance:ultrasound guided    ULTRASOUND INTERPRETATION.  Using ultrasound guidance a 20 G gauge needle was placed in close proximity to the nerve, at which point, under ultrasound guidance anesthetic was injected in the area of the nerve and spread of the anesthesia was seen on ultrasound in close proximity thereto.  There were no abnormalities seen on ultrasound; a digital image was taken; and the patient tolerated the procedure with no complications. Images:still images obtained, printed/placed on chart    Laterality:left  Anesthesia block type: Serratous Midaxillary.  Injection Technique:single-shot  Needle Type:echogenic  Needle Gauge:20 G  Resistance on Injection: none    Medications Used: dexamethasone sodium phosphate injection - Injection   2 mg - 12/20/2024 12:01:00 PM  ropivacaine (NAROPIN) 0.5 % injection - Injection   30 mL - 12/20/2024 12:01:00 PM      Medications  Preservative Free Saline:5ml    Post Assessment  Injection Assessment: no paresthesia on injection, incremental injection and negative aspiration for heme  Patient Tolerance:comfortable throughout block  Complications:no  Performed by: Uzair Mccartney CRNA

## 2024-12-20 NOTE — ANESTHESIA PROCEDURE NOTES
Subpectoral Midline      Patient location during procedure: holding area  Start time: 12/20/2024 11:52 AM  Reason for block: at surgeon's request  Performed by  CRNA/CAA: Uzair Mccartney CRNA  Assisted by: Bushra Giles RN  Preanesthetic Checklist  Completed: patient identified, IV checked, site marked, risks and benefits discussed, surgical consent, monitors and equipment checked, pre-op evaluation and timeout performed  Prep:  Pt Position: supine  Sterile barriers:cap, gloves and sterile barriers  Prep: ChloraPrep  Patient monitoring: blood pressure monitoring, continuous pulse oximetry and EKG  Procedure    Sedation: yes  Performed under: local infiltration  Guidance:ultrasound guided    ULTRASOUND INTERPRETATION.  Using ultrasound guidance a 20 G gauge needle was placed in close proximity to the nerve, at which point, under ultrasound guidance anesthetic was injected in the area of the nerve and spread of the anesthesia was seen on ultrasound in close proximity thereto.  There were no abnormalities seen on ultrasound; a digital image was taken; and the patient tolerated the procedure with no complications. Images:still images obtained, printed/placed on chart    Laterality:left  Anesthesia block type: Subpectoral Midline.  Injection Technique:single-shot  Needle Type:echogenic  Needle Gauge:20 G  Resistance on Injection: none    Medications Used: fentaNYL citrate (PF) (SUBLIMAZE) injection - Intravenous   100 mcg - 12/20/2024 11:52:00 AM  bupivacaine PF (MARCAINE) 0.25 % injection - Injection   30 mL - 12/20/2024 11:52:00 AM  dexamethasone sodium phosphate injection - Injection   2 mg - 12/20/2024 11:52:00 AM      Medications  Preservative Free Saline:5ml    Post Assessment  Injection Assessment: negative aspiration for heme, no paresthesia on injection and incremental injection  Patient Tolerance:comfortable throughout block  Complications:no  Performed by: Uzair Mccartney CRNA

## 2024-12-20 NOTE — Clinical Note
Prepped: chest and left abdomen. Prepped with: ChloraPrep. The site was clipped. The patient was draped in a sterile fashion.

## 2024-12-20 NOTE — OP NOTE
Cardiac Electrophysiology Procedure Note       [facility] at [facility]    PROCEDURE PERFORMED:  IMPLANTATION OF A TOTALLY SUBCUTANEOUS                                                           ICD (SICD).    OPERATIONS PERFORMED: Implantation of a [employer] SICD A219, [id number] serial number pulse generator with [employer] SICD lead 3501, [id number] serial number.    ATTENDING SURGEON:  Malick Valentin DO    MODERATE SEDATION FOR PROCEDURE:    Moderate sedation was given during this procedure.    I supervised and directed RN to administer this sedation.  This staff member also monitored the patient's hemodynamic and respiratory status and response to these medications.  Please see the full detailed procedure report generated by the electrophysiology laboratory staff.  The patient tolerated moderate sedation well.  There were no complications regarding sedation.  The total dose of fentanyl was 200 mcg and the total dose of midazolam was 4 mg.  The total dose of Brevital was 0.  First sedation was administered at 2:45 PM and continued through 3:31 PM.    ESTIMATED BLOOD LOSS: 20 cc or less    RADIATION EXPOSURE: 0 minutes and 0 milliGray.    COMPLICATIONS: None    TIME OUT: Time out was completed with verification of the correct patient identity, procedure to be performed, procedure site and implanted equipment.    INDICATION FOR PROCEDURE:  Briefly,  Devan Mason is a 59 y.o. year old male with a history of ischemic cardiomyopathy with a QRS duration of 90 milliseconds, severe systolic dysfunction with an LVEF of 35% and NYHA functional class 3 heart failure.  Last myocardial infarction was August 30, 2024.  Last revascularization was 2017.    Please especially note that the patient has been on maximally tolerated doses of guideline directed medical therapy, including but not limited to: HF indicated beta-blocker (carvedilol, metoprolol succinate), ACE Inhibitor or  Angiotensin receptor blocker.  Please note that for some of these medications the maximally tolerated dose may be zero.  The patient has not had a myocardial infarction within 40 days and has not had coronary revascularization within 90 days.    This patient who is a candidate for an ICD has a life expectancy greater than 12 months.    PROCEDURE AND FINDINGS:     The patient was able to give written informed consent after revisiting the key portions of the risk versus benefit profile of the procedure.  This discussion was framed by our lengthy conversations  (please see our detailed notes).  Patient verbalized strong understanding of this discussion and a strong desire to proceed with the procedure.  Please note that this detailed informed consent process utilized mutual and shared decision making process between all parties involved, principally the physician and patient, but also potentially with input from the patient's selected family and friends.    The patient was brought to the electrophysiology suite in a post-absorptive state.  Informed consent was obtained prior to the procedure and confirmed.  Intravenous prophylactic antibiotics were administered and confirmed to be completely infused prior to start of the procedure.  Additionally intravenous acetominophen was given prophylactically for pain as well.    Patient was appropriately screened for SICD implantation.  The following vectors were deemed acceptable in both the supine and standing position: All 3 vectors.    We assessed the patient for the appropriate site of implantation for the S ICD using a combination of surface anatomic landmarks including the angle of Ramos suprasternal notch xiphoid process 4th and 5th intercostal spaces mid axillary line and both right and left force of the sternum.  We confirmed the correct device implantation site with fluoroscopy.  The correct site of implantation was marked with an indelible ink marker.    The patient  was then prepared and draped in routine sterile fashion.  After the site of implantation was prepped and draped in the usual sterile fashion and after adequate anesthesia was given, the skin was infiltrated with 1% lidocaine and 0.5% bupivicaine 50/50 mixture.  The skin was incised with a #10 scalpel.  There were two incisions.  The primary incision is at the left lateral chest wall at the level of the 4th intercostal space approximately.  The secondary incision is at the xiphoid process.  With all incisions blunt, Metzenbaum scissor and electrosurgical dissection was carried out to the level of the fascia with careful attention paid to hemostasis.  A pocket to house the pulse generator was formed between the muscular fascia and subcutaneous fat with blunt and electrosurgical dissection.  The pocket dissection was carried out in a posterior direction.  Critical muscular anatomic landmarks of the serratus anterior pectoralis major and latissimus dorsi muscles were noted. These muscles formed the medial anterior and posterior aspects of the device pocket.  The pocket was copiously irrigated with antibiotic containing normal saline and subsequently observed.  Once adequate hemostasis was confirmed we packed the primary incision with several laparotomy sponges soaked in lidocaine bupivacaine epinephrine and gentamicin.  These were later removed with the correct surgical sponge count after the totally portion of the procedure.  We then tunneled the lead from the primary incision to the secondary incision and then along the sternum.  The lead was sutured appropriately to fascia with nonabsorbable suture.    We then removed the laparotomy sponges from the primary device pocket.  Once again we confirmed the correct surgical sponge count.    The S ICD pulse generator was brought onto the field the pin of the lead was then connected to the appropriate port.  Testing was performed showing this carried connection.  We then  "placed the S ICD pulse generator within the pocket.  The device was then secured to the header stitch.    Both wounds were then closed with 2 layers of absorbable suture and finally a layer of surgical adhesive.   We then placed a surgical dressing.    Noninvasive high-voltage circuit impedance testing revealed an impedance of 72 ohms.    The patient is this ICD was optimized in the room prior to the patient leaving.    The S ICD chose the primary vector, however the secondary and alternate vectors were also acceptable.    UNDERLYING RHYTHM: Sinus    PROGRAMMED DEVICE DATA:    Post shock pacing was programmed on.  Shock polarity is programmed as standard.  Two zones of tachycardia detection program.  There is a conditional shock zone programmed at 200 bpm and shock zone is additionally programmed at 250 bpm.  PLEASE NOTE THAT THE SHOCK OUTPUT ON THE S ICD IS NON PROGRAMMABLE AND IS FIXED AT 80 JOULES.    CONCLUSION:  Successful implantation of an SICD system.     RECOMMENDATIONS:  No Lovenox or heparin at any dose is to be given.        FOR THE PATIENT        1) We ask you the patient to to leave the wound open to the air.  The glue is the bandage.    2) We ask you to not shower or get the wounds wet for 2 days only.  After this you may shower but we ask that you not scrub or attempt to \"clean\" your surgery wounds.    3) Patients may experience some discomfort especially when performing side bending or twisting of the torso.  This is normal.  Mild to moderate pain is normal and is to be expected.  While there is no explicit recommendation to not lift any heavy objects or raise the left arm above a certain level, doing so may result in some discomfort.  The discomfort should be limiting factor in performing these maneuvers.  In other words, discomfort should limit your activity.  It is not our goal to completely eliminate your pain as this may lead to overactivity, poor wound healing and worse outcomes.  We prefer " not to prescribe narcotics for this reason.    4) We ask that you call our office at  with any questions, 24 hours a day and specifically ask for the EP doctor on call with any questions about the device or the wounds.    5) We ask that you follow up with our EP Service Nurse Practitioners in approximately one week and then with me in 3 months.          Malick Valentin DO, [facility], [facility]  Cardiac Electrophysiologist  [facility] / [facility]

## 2024-12-20 NOTE — H&P
Pre-cardiac Device History and Physical  [facility] at [facility]      Patient:  Devan Mason  :  1965  MRN: [id number]    PCP:  Krystle Dickson MD  PHONE:  [phone]    DATE: 2024  ID: Devan Mason is a 59 y.o. male from [facility]    CC: heart failure, high risk for sudden cardiac death    Active Hospital Problems    Diagnosis  POA    **Chronic systolic heart failure [I50.22]  Unknown    Ischemic cardiomyopathy [I25.5]  Unknown     Echo, 2020: EF 35 to 40%  Echo, 2023: EF 48%  2024 LVEF 31-35%      Primary hypertension [I10]  Yes    CAD (coronary artery disease) [I25.10]  Yes     Status post coronary artery bypass - LIMA to LAD/D1, SVG to PDA, SVG to OM  [facility]  -no other disease noted, grafts patent, LAD and % stenosed  [facility] 2024   All widely patent grafts. Native vessel severe RCA, left main disease with with occluded LAD.  Patent LIMA to LAD Patent SVG to small diagonal  Patent SVG to OM1 and OM 3. Patent SVG to distal RCA LVEF 30%             BRIEF HPI:   Mr. Mason is a 60 y/o male with the above medical history who presents for subcutaneous ICD. He has ischemic cardiomyopathy with persistent LVEF <35% despite optimally tolerated GDMT >90 days.     Allergies:      No Known Allergies    MEDICATIONS:  Current Outpatient Medications   Medication Instructions    aspirin 81 mg, Daily    Cinnamon 500 MG tablet 2 tablets, Daily    famotidine (PEPCID) 20 mg, Oral, Daily    fish oil 1,000 mg, Daily With Breakfast    metoprolol succinate XL (TOPROL-XL) 12.5 mg, Oral, Daily    Multiple Vitamins-Minerals (MULTIVITAMIN ADULT, MINERALS, PO) 1 tablet, Daily    nitroglycerin (NITROSTAT) 0.4 MG SL tablet 1 under the tongue as needed for angina, may repeat q5mins for up three doses    NON FORMULARY 450 mg, Daily    omeprazole (PRILOSEC) 20 mg, Oral, Daily    Psyllium (Metamucil) wafer wafer 2 wafers, Daily    rosuvastatin (CRESTOR) 40 mg, Oral,  Daily    sacubitril-valsartan (ENTRESTO) 24-26 MG tablet 1 tablet, 2 Times Daily    sildenafil (REVATIO) 20 mg, Oral, 3 Times Daily PRN    vitamin B-12 (CYANOCOBALAMIN) 1,000 mcg, 3 Times Weekly    Vitamin D3 2,000 Units, Daily       Past medical & surgical history, social and family history reviewed in the electronic medical record.    ROS: Pertinent positives listed in the HPI and problem list above. All others reviewed and negative.     Physical Exam:   There were no vitals taken for this visit.    Constitutional:    Well-appearing 59 y.o. y/o adult  in no acute distress        Heart:    Regular rhythm and normal rate, no murmurs, rubs or gallops   Lungs:     Clear to auscultation bilaterally, no wheezes, rhales or rhonchi, nonlabored respirations       Extremities:   No gross deformities, no edema, clubbing, or cyanosis.    Pulses:    Neuro:  Psych:   Radial pulses palpable and equal bilaterally.  No gross focal deficits  Mood and behavior appropriate for situation         Labs and Diagnostic Data:  Results from last 7 days   Lab Units 12/16/24  1359   SODIUM mmol/L 141   POTASSIUM mmol/L 4.4   CHLORIDE mmol/L 105   CO2 mmol/L 25.0   BUN mg/dL 15   CREATININE mg/dL 1.11   GLUCOSE mg/dL 94   CALCIUM mg/dL 9.7     Results from last 7 days   Lab Units 12/16/24  1359   WBC 10*3/mm3 9.12   HEMOGLOBIN g/dL 15.7   HEMATOCRIT % 46.0   PLATELETS 10*3/mm3 191     Lab Results   Component Value Date    CHOL 141 08/30/2024    TRIG 134 08/30/2024    HDL 39 (L) 08/30/2024    LDL 78 08/30/2024    AST 28 08/30/2024    ALT 40 08/30/2024                   Tele: NSR    IMPRESSION:  Mr. Ahumada is a 59-year-old male with history of ischemic cardiomyopathy and persistently reduced LVEF <35% despite optimally tolerated GDMT for >90 days who presents for subcutaneous ICD implantation for the primary prevention of sudden cardiac death.    The patient and provider came to the conclusion to proceed with ICD implantation for the primary  "prevention of sudden cardiac death via direct conversation regarding shared decision making for this decision with the notification that this is not a mandatory procedure, but could be a life-saving procedure.  The patient was also provided with written handout \"a decision aid for implantable cardioverter-defibrillators (ICD) for patients with heart failure considering ICD who are at risk for sudden cardiac death (primary prevention).\"     PLAN:  Procedure to perform: Subcutaneous ICD implantation with thoracic nerve block. Risks, benefits and alternatives to the procedure explained to the patient and he understands and wishes to proceed.     Electronically signed by Asa العلي PA-C, 12/20/24, 11:30 AM EST.        "

## 2024-12-20 NOTE — Clinical Note
"        [place]   2017 11:45 AM   Office Visit   MRN: [id number]    Department:  [facility]   Dept Phone:  [phone]    Description:  Female : 1985   Provider:  Giuliano Capps PA-C           Reason for Visit     Eye Problem eye redness, drainage, slightly itchy      Allergies as of 2017     Not on File      You were diagnosed with     Conjunctivitis of both eyes, unspecified conjunctivitis type   [[id number]]         Vital Signs     Blood Pressure Pulse Temperature Respirations Height Weight    110/70 mmHg 74 36.7 °C (98 °F) 16 1.6 m (5' 3\") 78.563 kg (173 lb 3.2 oz)    Body Mass Index Oxygen Saturation                30.69 kg/m2 98%          Basic Information     Date Of Birth Sex Race Ethnicity Preferred Language    1985 Female White Non- English      Health Maintenance     Patient has no pending health maintenance at this time      Current Immunizations     Tuberculin Skin Test 2017 11:14 AM      Below and/or attached are the medications your provider expects you to take. Review all of your home medications and newly ordered medications with your provider and/or pharmacist. Follow medication instructions as directed by your provider and/or pharmacist. Please keep your medication list with you and share with your provider. Update the information when medications are discontinued, doses are changed, or new medications (including over-the-counter products) are added; and carry medication information at all times in the event of emergency situations     Allergies:  No Known Allergies          Medications  Valid as of: 2017 - 11:59 AM    Generic Name Brand Name Tablet Size Instructions for use    Ciprofloxacin HCl (Solution) CILOXIN 0.3 % Place 1 gtt into affected eye[s] q3hrs        .                 Medicines prescribed today were sent to:     [id number] - [place], [place] - [address]    [address] " Lead tunneled from lateral pocket to subxiphoid incision. [place] [id number]    Phone: [phone] Fax: [phone]    Open 24 Hours?: No      Medication refill instructions:       If your prescription bottle indicates you have medication refills left, it is not necessary to call your provider’s office. Please contact your pharmacy and they will refill your medication.    If your prescription bottle indicates you do not have any refills left, you may request refills at any time through one of the following ways: The online [employer] system (except Urgent Care), by calling your provider’s office, or by asking your pharmacy to contact your provider’s office with a refill request. Medication refills are processed only during regular business hours and may not be available until the next business day. Your provider may request additional information or to have a follow-up visit with you prior to refilling your medication.   *Please Note: Medication refills are assigned a new Rx number when refilled electronically. Your pharmacy may indicate that no refills were authorized even though a new prescription for the same medication is available at the pharmacy. Please request the medicine by name with the pharmacy before contacting your provider for a refill.           [employer] Access Code: Activation code not generated  Current [employer] Status: Active

## 2024-12-23 ENCOUNTER — CALL CENTER PROGRAMS (OUTPATIENT)
Dept: CALL CENTER | Facility: HOSPITAL | Age: 59
End: 2024-12-23
Payer: COMMERCIAL

## 2024-12-23 NOTE — OUTREACH NOTE
PCI/Device Survey      Flowsheet Row Responses   Facility patient discharged from? [facility]   Procedure date 12/20/24   Procedure (if device, specify in description) Device   Device Description device implant   Performing MD Dr. Malick Valentin   Attempt successful? Yes   Call start time 1458   Call end time 1507   Person spoke with today (if not patient) and relationship patient   Has the patient had any of the following symptoms since discharge? --  [Patient reports he is having alot of pain from incision and an excessive amount of bruising]   Nursing interventions Advised to call MD   Does the patient have any of the following symptoms related to the cath/surgical site? Bruising, Unusal pain at the site   Nursing intervention Advised patient to call cardiology office   Does the patient have an appointment scheduled with the cardiologist? Yes   Appointment comments Has appt for a followup today   If the patient is a current smoker, are they able to teach back resources for cessation? Not a smoker   PCI/Device call completed Yes   Wrap up additional comments Patient is having alot of incisional pain and bruising. Advised him to call cardiology.            Chet KELLER - Registered Nurse

## 2024-12-26 ENCOUNTER — OFFICE VISIT (OUTPATIENT)
Age: 59
End: 2024-12-26
Payer: COMMERCIAL

## 2024-12-26 VITALS
WEIGHT: 225 LBS | HEART RATE: 70 BPM | SYSTOLIC BLOOD PRESSURE: 132 MMHG | OXYGEN SATURATION: 99 % | HEIGHT: 72 IN | BODY MASS INDEX: 30.48 KG/M2 | DIASTOLIC BLOOD PRESSURE: 68 MMHG

## 2024-12-26 DIAGNOSIS — I25.5 ISCHEMIC CARDIOMYOPATHY: ICD-10-CM

## 2024-12-26 DIAGNOSIS — I10 PRIMARY HYPERTENSION: ICD-10-CM

## 2024-12-26 DIAGNOSIS — Z95.810 ICD (IMPLANTABLE CARDIOVERTER-DEFIBRILLATOR), DUAL, IN SITU: Primary | ICD-10-CM

## 2024-12-26 PROCEDURE — 99024 POSTOP FOLLOW-UP VISIT: CPT | Performed by: INTERNAL MEDICINE

## 2024-12-26 PROCEDURE — 93282 PRGRMG EVAL IMPLANTABLE DFB: CPT | Performed by: INTERNAL MEDICINE

## 2024-12-26 RX ORDER — OXYCODONE AND ACETAMINOPHEN 5; 325 MG/1; MG/1
1 TABLET ORAL EVERY 6 HOURS PRN
Qty: 30 TABLET | Refills: 0 | Status: SHIPPED | OUTPATIENT
Start: 2024-12-26

## 2024-12-26 NOTE — PROGRESS NOTES
[facility] Provider Note     Date:   2024   Name: Devan Mason  :   1965  PCP: Krystle Dickson MD    Chief Complaint   Patient presents with    Congestive Heart Failure     Pt here for follow up wound check post ICD implant on 24.       Subjective     History of Present Illness  Devan Mason is a 59 y.o. male who presents today for follow up after ICD ([place]) placement on . Wound check  Pt has reports of large hematoma to left side. Has called Dr Song with no call back and is upset about that. Glad to be in clinic today.  Has no reports of chest pain,palpitation,shortness of air,dizziness.  Has a lot of pain on left side under incision all the way down the side. Did not get any pain medication. Hurts to much to get in and out of bed so is sleeping in recliner.  Has been adjusting to everything.     Cardiology/sleep history  1.  Coronary artery disease  -Status post coronary artery bypass - LIMA to LAD/D1, SVG to PDA, SVG to OM  -[facility]  -no other disease noted, grafts patent, LAD and % stenosed  -[facility] 2024   All widely patent grafts. Native vessel severe RCA, left main disease with with occluded LAD.  Patent LIMA to LAD Patent SVG to small diagonal  Patent SVG to OM1 and OM 3. Patent SVG to distal RCA LVEF 30%    2.  Hypertension  3.  Hypercholesterolemia  4.  Ischemic cardiomyopathy-EF 45%   -30% at cath 2024  5.  Mild to moderate mitral regurgitation     No Known Allergies    Current Outpatient Medications:     aspirin 81 MG chewable tablet, Chew 1 tablet Daily., Disp: , Rfl:     Cholecalciferol (Vitamin D3) 50 MCG (2000) capsule, Take 1 capsule by mouth Daily., Disp: , Rfl:     Cinnamon 500 MG tablet, Take 2 tablets by mouth Daily., Disp: , Rfl:     famotidine (PEPCID) 20 MG tablet, Take 1 tablet by mouth Daily., Disp: 90 tablet, Rfl: 1    metoprolol succinate XL (TOPROL-XL) 25 MG 24 hr tablet, Take 0.5 tablets by  mouth Daily., Disp: 45 tablet, Rfl: 1    Multiple Vitamins-Minerals (MULTIVITAMIN ADULT, MINERALS, PO), Take 1 tablet by mouth Daily., Disp: , Rfl:     nitroglycerin (NITROSTAT) 0.4 MG SL tablet, 1 under the tongue as needed for angina, may repeat q5mins for up three doses (Patient taking differently: Place 1 tablet under the tongue Every 5 (Five) Minutes As Needed for Chest Pain. 1 under the tongue as needed for angina, may repeat q5mins for up three doses), Disp: 100 tablet, Rfl: 11    NON FORMULARY, Take 450 mg by mouth Daily. Red Miquel, Disp: , Rfl:     Omega-3 Fatty Acids (fish oil) 1000 MG capsule capsule, Take 1 capsule by mouth Daily With Breakfast., Disp: , Rfl:     omeprazole (priLOSEC) 20 MG capsule, Take 1 capsule by mouth Daily. (Patient taking differently: Take 1 capsule by mouth Every Other Day. Alternates with pepcid), Disp: 90 capsule, Rfl: 1    Psyllium (Metamucil) wafer wafer, Take 2 wafers by mouth Daily., Disp: , Rfl:     rosuvastatin (CRESTOR) 40 MG tablet, Take 1 tablet by mouth Daily. (Patient taking differently: Take 1 tablet by mouth Every Night.), Disp: 90 tablet, Rfl: 3    sacubitril-valsartan (ENTRESTO) 24-26 MG tablet, Take 1 tablet by mouth 2 (Two) Times a Day., Disp: , Rfl:     sildenafil (REVATIO) 20 MG tablet, Take 1 tablet by mouth 3 (Three) Times a Day As Needed (erectile dysfunction)., Disp: 30 tablet, Rfl: 11    vitamin B-12 (CYANOCOBALAMIN) 1000 MCG tablet, Take 1 tablet by mouth 3 (Three) Times a Week. 3x per week, Disp: , Rfl:     oxyCODONE-acetaminophen (Percocet) 5-325 MG per tablet, Take 1 tablet by mouth Every 6 (Six) Hours As Needed for Severe Pain., Disp: 30 tablet, Rfl: 0    Past Medical History:   Diagnosis Date    Chronic kidney disease     Chronic systolic heart failure     Coronary artery disease 2017    Status post coronary artery bypass -2017 LIMA to LAD/D1, SVG to PDA, SVG to OM    GERD (gastroesophageal reflux disease)     Hyperlipidemia     Hypertension      "Ischemic cardiomyopathy     Mitral regurgitation     Mild to moderate mitral regurgitation     Myocardial infarction 2017      Past Surgical History:   Procedure Laterality Date    CARDIAC CATHETERIZATION      CARDIAC CATHETERIZATION N/A 2024    Procedure: Left Heart Cath;  Surgeon: Nazario Carmichael MD;  Location:  [facility];  Service: Cardiovascular;  Laterality: N/A;    COLONOSCOPY      CORONARY ARTERY BYPASS GRAFT      Status post coronary artery bypass - LIMA to LAD/D1, SVG to PDA, SVG to OM    ICD INSERTION N/A 2024    Procedure: SICD Implant (BSC), no meds to hold, nerve block;  Surgeon: Malick Valentin DO;  Location:  [facility];  Service: Cardiovascular;  Laterality: N/A;    SHOULDER SURGERY Left 10/09/2024     Family History   Problem Relation Age of Onset    Heart disease Mother     Cancer Mother     Heart attack Mother     Heart disease Brother      Social History     Socioeconomic History    Marital status:    Tobacco Use    Smoking status: Former     Current packs/day: 0.00     Average packs/day: 1.3 packs/day for 41.0 years (51.3 ttl pk-yrs)     Types: Cigarettes     Start date:      Quit date:      Years since quittin.9     Passive exposure: Never    Smokeless tobacco: Never    Tobacco comments:     43 years   Vaping Use    Vaping status: Never Used   Substance and Sexual Activity    Alcohol use: Yes     Comment: Occasionally    Drug use: Never    Sexual activity: Defer     Partners: Female       Objective     Vital Signs:  /68 (BP Location: Right arm, Patient Position: Sitting, Cuff Size: Adult)   Pulse 70   Ht 182.9 cm (72\")   Wt 102 kg (225 lb)   SpO2 99%   BMI 30.52 kg/m²   Estimated body mass index is 30.52 kg/m² as calculated from the following:    Height as of this encounter: 182.9 cm (72\").    Weight as of this encounter: 102 kg (225 lb).         Physical Exam  Constitutional:       Appearance: Normal " appearance. He is well-developed.   HENT:      Head: Normocephalic and atraumatic.   Eyes:      General: No scleral icterus.     Pupils: Pupils are equal, round, and reactive to light.   Cardiovascular:      Rate and Rhythm: Normal rate and regular rhythm.      Heart sounds: Normal heart sounds. No murmur heard.  Pulmonary:      Breath sounds: Normal breath sounds. No wheezing or rhonchi.   Musculoskeletal:      Right lower leg: No edema.      Left lower leg: No edema.   Skin:     Capillary Refill: Capillary refill takes less than 2 seconds.      Coloration: Skin is not cyanotic.      Findings: Bruising present.      Nails: There is no clubbing.      Comments: Left oblique and axillary area and bruised, large throughout the entire stomach wall.  Seems to be old and resolving.  ICD incisions x 2 in epigastric area and left axillary area both healing well with no signs of infection.  No unusual swelling or erythema.     Neurological:      Mental Status: He is alert and oriented to person, place, and time.      Motor: No weakness.      Gait: Gait normal.   Psychiatric:         Mood and Affect: Mood normal.         Behavior: Behavior is cooperative.         Thought Content: Thought content normal.         Cognition and Memory: Memory normal.           Implantable cardiac device download reviewed: [employer] ICD          Assessment and Plan     Assessment & Plan  ICD (implantable cardioverter-defibrillator), dual, in situ  Will give him some hydrocodone as needed for the pain from ICD implant.  Orders:    oxyCODONE-acetaminophen (Percocet) 5-325 MG per tablet; Take 1 tablet by mouth Every 6 (Six) Hours As Needed for Severe Pain.    Ischemic cardiomyopathy  Euvolemic and stable.  On medical therapy.       Primary hypertension  Stable.  Follow-up next appointment.                      Follow Up  No follow-ups on file.    ROCCO Galarza MD  Cardiology and Sleep [facility]  12/26/2024     Please note that this explicitly  excludes time spent on other separate billable services such as performing procedures or test interpretation, when applicable.    This note was created using dictation software which occasionally transcribes nonsensical phrases. Please contact the provider if any clarification is needed.

## 2025-01-23 ENCOUNTER — TELEPHONE (OUTPATIENT)
Dept: CARDIOLOGY | Facility: CLINIC | Age: 60
End: 2025-01-23
Payer: COMMERCIAL

## 2025-01-23 NOTE — TELEPHONE ENCOUNTER
Enrolled patient in 's [facility]. Called patient to trouble shoot monitor. [place] very limited cell signal. I have ordered him a ethernet cord and when it arrives he will hook the monitor up through his internet. He will call me for assistance with this when it arrives if needed.

## 2025-02-16 ENCOUNTER — PATIENT MESSAGE (OUTPATIENT)
Age: 60
End: 2025-02-16
Payer: COMMERCIAL

## 2025-02-16 DIAGNOSIS — I25.810 CORONARY ARTERY DISEASE INVOLVING CORONARY BYPASS GRAFT OF NATIVE HEART WITHOUT ANGINA PECTORIS: ICD-10-CM

## 2025-02-17 RX ORDER — ROSUVASTATIN CALCIUM 40 MG/1
40 TABLET, COATED ORAL NIGHTLY
Qty: 90 TABLET | Refills: 3 | Status: SHIPPED | OUTPATIENT
Start: 2025-02-17

## 2025-05-06 ENCOUNTER — OFFICE VISIT (OUTPATIENT)
Age: 60
End: 2025-05-06
Payer: COMMERCIAL

## 2025-05-06 VITALS
SYSTOLIC BLOOD PRESSURE: 110 MMHG | BODY MASS INDEX: 30.88 KG/M2 | DIASTOLIC BLOOD PRESSURE: 70 MMHG | WEIGHT: 228 LBS | HEART RATE: 73 BPM | OXYGEN SATURATION: 97 % | HEIGHT: 72 IN

## 2025-05-06 DIAGNOSIS — I25.810 CORONARY ARTERY DISEASE INVOLVING CORONARY BYPASS GRAFT OF NATIVE HEART WITHOUT ANGINA PECTORIS: Primary | ICD-10-CM

## 2025-05-06 DIAGNOSIS — I50.22 CHRONIC SYSTOLIC HEART FAILURE: ICD-10-CM

## 2025-05-06 DIAGNOSIS — Z95.810 ICD (IMPLANTABLE CARDIOVERTER-DEFIBRILLATOR), DUAL, IN SITU: ICD-10-CM

## 2025-05-06 PROCEDURE — 99214 OFFICE O/P EST MOD 30 MIN: CPT | Performed by: INTERNAL MEDICINE

## 2025-05-06 RX ORDER — OMEPRAZOLE 20 MG/1
20 CAPSULE, DELAYED RELEASE ORAL DAILY
Qty: 90 CAPSULE | Refills: 1 | Status: SHIPPED | OUTPATIENT
Start: 2025-05-06

## 2025-05-06 RX ORDER — NITROGLYCERIN 0.4 MG/1
TABLET SUBLINGUAL
Qty: 100 TABLET | Refills: 11 | Status: SHIPPED | OUTPATIENT
Start: 2025-05-06

## 2025-05-06 RX ORDER — FAMOTIDINE 20 MG/1
20 TABLET, FILM COATED ORAL DAILY
Qty: 90 TABLET | Refills: 1 | Status: SHIPPED | OUTPATIENT
Start: 2025-05-06

## 2025-05-06 RX ORDER — ROSUVASTATIN CALCIUM 40 MG/1
40 TABLET, COATED ORAL NIGHTLY
Qty: 90 TABLET | Refills: 3 | Status: SHIPPED | OUTPATIENT
Start: 2025-05-06

## 2025-05-06 RX ORDER — METOPROLOL SUCCINATE 25 MG/1
12.5 TABLET, EXTENDED RELEASE ORAL DAILY
Qty: 45 TABLET | Refills: 1 | Status: SHIPPED | OUTPATIENT
Start: 2025-05-06

## 2025-05-06 RX ORDER — CETIRIZINE HYDROCHLORIDE 10 MG/1
10 TABLET ORAL DAILY
COMMUNITY

## 2025-05-06 NOTE — PROGRESS NOTES
[facility] Provider Note     Date:   2025   Name: Devan Mason  :   1965  PCP: Krystle Dickson MD    Chief Complaint   Patient presents with    Hypertension     Pt states he is here today for follow up HTN. No chest pain but does have SOA.     Coronary Artery Disease     Pt states he is here today for follow up CAD.       Subjective     History of Present Illness  Devan Mason is a 59 y.o. male with CHF, CAD who presents today for follow up.   Reports of an incident that he was unsure if he was dreaming or it actually happened but he thought that he may have lifted up the bed one night as in being shocked.  Defibrillator check today. States that it is uncomfortable. Can not sleep on that side and when moves can feel the meat pinching on it.  Reports that has no chest pain or palpitations.  Reports that has been more short of breath than normal.   No real swelling.   Reports has been lazy and gaining weight.  No chest pain or palpitations.   Only concern is refills needed.    Cardiology/sleep history  1.  Coronary artery disease  -Status post coronary artery bypass - LIMA to LAD/D1, SVG to PDA, SVG to OM  -[facility] 2018 -no other disease noted, grafts patent, LAD and % stenosed  -[facility] 2024   All widely patent grafts. Native vessel severe RCA, left main disease with with occluded LAD.  Patent LIMA to LAD Patent SVG to small diagonal  Patent SVG to OM1 and OM 3. Patent SVG to distal RCA LVEF 30%    2.  Hypertension  3.  Hypercholesterolemia  4.  Ischemic cardiomyopathy-EF 45%   -30% at cath 2024  5.  Mild to moderate mitral regurgitation     No Known Allergies    Current Outpatient Medications:     aspirin 81 MG chewable tablet, Chew 1 tablet Daily., Disp: , Rfl:     cetirizine (zyrTEC) 10 MG tablet, Take 1 tablet by mouth Daily., Disp: , Rfl:     Cholecalciferol (Vitamin D3) 50 MCG (2000) capsule, Take 1 capsule by mouth Daily., Disp: ,  Rfl:     Cinnamon 500 MG tablet, Take 2 tablets by mouth Daily., Disp: , Rfl:     famotidine (PEPCID) 20 MG tablet, Take 1 tablet by mouth Daily., Disp: 90 tablet, Rfl: 1    metoprolol succinate XL (TOPROL-XL) 25 MG 24 hr tablet, Take 0.5 tablets by mouth Daily., Disp: 45 tablet, Rfl: 1    Misc Natural Products (PROSTATE COMPLETE PO), Take  by mouth., Disp: , Rfl:     Multiple Vitamins-Minerals (MULTIVITAMIN ADULT, MINERALS, PO), Take 1 tablet by mouth Daily., Disp: , Rfl:     nitroglycerin (NITROSTAT) 0.4 MG SL tablet, 1 under the tongue as needed for angina, may repeat q5mins for up three doses, Disp: 100 tablet, Rfl: 11    NON FORMULARY, Take 450 mg by mouth Daily. Red Miquel, Disp: , Rfl:     Omega-3 Fatty Acids (fish oil) 1000 MG capsule capsule, Take 1 capsule by mouth Daily With Breakfast., Disp: , Rfl:     omeprazole (priLOSEC) 20 MG capsule, Take 1 capsule by mouth Daily., Disp: 90 capsule, Rfl: 1    Psyllium (Metamucil) wafer wafer, Take 2 wafers by mouth Daily., Disp: , Rfl:     rosuvastatin (CRESTOR) 40 MG tablet, Take 1 tablet by mouth Every Night., Disp: 90 tablet, Rfl: 3    sacubitril-valsartan (ENTRESTO) 24-26 MG tablet, Take 1 tablet by mouth 2 (Two) Times a Day., Disp: 180 tablet, Rfl: 3    sildenafil (REVATIO) 20 MG tablet, Take 1 tablet by mouth 3 (Three) Times a Day As Needed (erectile dysfunction)., Disp: 30 tablet, Rfl: 11    vitamin B-12 (CYANOCOBALAMIN) 1000 MCG tablet, Take 1 tablet by mouth 3 (Three) Times a Week. 3x per week, Disp: , Rfl:     Past Medical History:   Diagnosis Date    Chronic kidney disease     Chronic systolic heart failure     Coronary artery disease 2017    Status post coronary artery bypass -2017 LIMA to LAD/D1, SVG to PDA, SVG to OM    GERD (gastroesophageal reflux disease)     Hyperlipidemia     Hypertension     Ischemic cardiomyopathy     Mitral regurgitation 2022    Mild to moderate mitral regurgitation 12/22    Myocardial infarction 1/2017      Past Surgical  "History:   Procedure Laterality Date    CARDIAC CATHETERIZATION      CARDIAC CATHETERIZATION N/A 2024    Procedure: Left Heart Cath;  Surgeon: Nazario Carmichael MD;  Location:  [facility];  Service: Cardiovascular;  Laterality: N/A;    COLONOSCOPY      CORONARY ARTERY BYPASS GRAFT      Status post coronary artery bypass - LIMA to LAD/D1, SVG to PDA, SVG to OM    ICD INSERTION N/A 2024    Procedure: SICD Implant (BSC), no meds to hold, nerve block;  Surgeon: Malick Valentin DO;  Location:  [facility];  Service: Cardiovascular;  Laterality: N/A;    SHOULDER SURGERY Left 10/09/2024     Family History   Problem Relation Age of Onset    Heart disease Mother     Cancer Mother     Heart attack Mother     Heart disease Brother      Social History     Socioeconomic History    Marital status:    Tobacco Use    Smoking status: Former     Current packs/day: 0.00     Average packs/day: 1.3 packs/day for 41.0 years (51.3 ttl pk-yrs)     Types: Cigarettes     Start date:      Quit date:      Years since quittin.3     Passive exposure: Never    Smokeless tobacco: Never    Tobacco comments:     43 years   Vaping Use    Vaping status: Never Used   Substance and Sexual Activity    Alcohol use: Yes     Comment: Occasionally    Drug use: Never    Sexual activity: Defer     Partners: Female       Objective     Vital Signs:  /70 (BP Location: Right arm, Patient Position: Sitting, Cuff Size: Adult)   Pulse 73   Ht 182.9 cm (72\")   Wt 103 kg (228 lb)   SpO2 97%   BMI 30.92 kg/m²   Estimated body mass index is 30.92 kg/m² as calculated from the following:    Height as of this encounter: 182.9 cm (72\").    Weight as of this encounter: 103 kg (228 lb).         Physical Exam  Constitutional:       Appearance: Normal appearance. He is well-developed.   HENT:      Head: Normocephalic and atraumatic.   Eyes:      General: No scleral icterus.     Pupils: Pupils are " equal, round, and reactive to light.   Cardiovascular:      Rate and Rhythm: Normal rate and regular rhythm.      Heart sounds: Normal heart sounds. No murmur heard.  Pulmonary:      Breath sounds: Normal breath sounds. No wheezing or rhonchi.   Musculoskeletal:      Right lower leg: No edema.      Left lower leg: No edema.   Skin:     Capillary Refill: Capillary refill takes less than 2 seconds.      Coloration: Skin is not cyanotic.      Nails: There is no clubbing.   Neurological:      Mental Status: He is alert and oriented to person, place, and time.      Motor: No weakness.      Gait: Gait normal.   Psychiatric:         Mood and Affect: Mood normal.         Behavior: Behavior is cooperative.         Thought Content: Thought content normal.                     Assessment and Plan     ASSESSMENTS AND ORDERS  Diagnoses and all orders for this visit:    1. Coronary artery disease involving coronary bypass graft of native heart without angina pectoris (Primary)  -     metoprolol succinate XL (TOPROL-XL) 25 MG 24 hr tablet; Take 0.5 tablets by mouth Daily.  Dispense: 45 tablet; Refill: 1  -     rosuvastatin (CRESTOR) 40 MG tablet; Take 1 tablet by mouth Every Night.  Dispense: 90 tablet; Refill: 3  -     nitroglycerin (NITROSTAT) 0.4 MG SL tablet; 1 under the tongue as needed for angina, may repeat q5mins for up three doses  Dispense: 100 tablet; Refill: 11    2. Chronic systolic heart failure  -     sacubitril-valsartan (ENTRESTO) 24-26 MG tablet; Take 1 tablet by mouth 2 (Two) Times a Day.  Dispense: 180 tablet; Refill: 3  -     metoprolol succinate XL (TOPROL-XL) 25 MG 24 hr tablet; Take 0.5 tablets by mouth Daily.  Dispense: 45 tablet; Refill: 1    3. ICD (implantable cardioverter-defibrillator), dual, in situ    Other orders  -     famotidine (PEPCID) 20 MG tablet; Take 1 tablet by mouth Daily.  Dispense: 90 tablet; Refill: 1  -     omeprazole (priLOSEC) 20 MG capsule; Take 1 capsule by mouth Daily.  Dispense:  90 capsule; Refill: 1      Other orders    famotidine (PEPCID) 20 MG tablet; Take 1 tablet by mouth Daily.    omeprazole (priLOSEC) 20 MG capsule; Take 1 capsule by mouth Daily.      PLAN  -ICD no shocks, I think this was a dream  -CAD stable and no chest pain, refill medical thearpy  -CHF euvolemic, continue medical therapy.             Follow Up  Return in about 3 months (around 8/6/2025) for PM/ICD check.    ROCCO Galarza MD  Cardiology and Sleep [facility]  05/06/2025     Please note that this explicitly excludes time spent on other separate billable services such as performing procedures or test interpretation, when applicable.    This note was created using dictation software which occasionally transcribes nonsensical phrases. Please contact the provider if any clarification is needed.

## 2025-07-14 ENCOUNTER — TELEPHONE (OUTPATIENT)
Dept: CARDIOLOGY | Facility: CLINIC | Age: 60
End: 2025-07-14

## 2025-07-15 LAB
MC_CV_MDC_IDC_RATE_1: 200
MC_CV_MDC_IDC_RATE_1: 250
MC_CV_MDC_IDC_SHOCK_MEASURED_IMPEDANCE: 80
MC_CV_MDC_IDC_THERAPIES: NORMAL
MC_CV_MDC_IDC_THERAPIES: NORMAL
MC_CV_MDC_IDC_ZONE_ID: 1
MC_CV_MDC_IDC_ZONE_ID: 2
MDC_IDC_MSMT_BATTERY_REMAINING_PERCENTAGE: 95 %
MDC_IDC_MSMT_BATTERY_STATUS: NORMAL
MDC_IDC_PG_IMPLANT_DTM: NORMAL
MDC_IDC_PG_MFG: NORMAL
MDC_IDC_PG_MODEL: NORMAL
MDC_IDC_PG_SERIAL: NORMAL
MDC_IDC_PG_TYPE: NORMAL
MDC_IDC_SESS_DTM: NORMAL
MDC_IDC_SESS_TYPE: NORMAL
MDC_IDC_SET_ZONE_STATUS: NORMAL
MDC_IDC_SET_ZONE_STATUS: NORMAL
MDC_IDC_SET_ZONE_TYPE: NORMAL
MDC_IDC_SET_ZONE_TYPE: NORMAL
MDC_IDC_STAT_TACHYTHERAPY_SHOCKS_DELIVERED_RECENT: 0

## 2025-08-04 LAB
MC_CV_MDC_IDC_RATE_1: 200
MC_CV_MDC_IDC_RATE_1: 250
MC_CV_MDC_IDC_SHOCK_MEASURED_IMPEDANCE: 80
MC_CV_MDC_IDC_THERAPIES: NORMAL
MC_CV_MDC_IDC_THERAPIES: NORMAL
MC_CV_MDC_IDC_ZONE_ID: 1
MC_CV_MDC_IDC_ZONE_ID: 2
MDC_IDC_MSMT_BATTERY_REMAINING_PERCENTAGE: 95 %
MDC_IDC_MSMT_BATTERY_STATUS: NORMAL
MDC_IDC_PG_IMPLANT_DTM: NORMAL
MDC_IDC_PG_MFG: NORMAL
MDC_IDC_PG_MODEL: NORMAL
MDC_IDC_PG_SERIAL: NORMAL
MDC_IDC_PG_TYPE: NORMAL
MDC_IDC_SESS_DTM: NORMAL
MDC_IDC_SESS_TYPE: NORMAL
MDC_IDC_SET_ZONE_STATUS: NORMAL
MDC_IDC_SET_ZONE_STATUS: NORMAL
MDC_IDC_SET_ZONE_TYPE: NORMAL
MDC_IDC_SET_ZONE_TYPE: NORMAL
MDC_IDC_STAT_TACHYTHERAPY_SHOCKS_DELIVERED_RECENT: 0

## (undated) DEVICE — MODEL AT P65, P/N 701554-001KIT CONTENTS: HAND CONTROLLER, 3-WAY HIGH-PRESSURE STOPCOCK WITH ROTATING END AND PREMIUM HIGH-PRESSURE TUBING: Brand: ANGIOTOUCH® KIT

## (undated) DEVICE — DRSNG SURESITE123 4X4.8IN

## (undated) DEVICE — PLASMABLADE X PS210-030S-LIGHT 3.0SL: Brand: PLASMABLADE™ X

## (undated) DEVICE — MODEL BT2000 P/N 700287-012KIT CONTENTS: MANIFOLD WITH SALINE AND CONTRAST PORTS, SALINE TUBING WITH SPIKE AND HAND SYRINGE, TRANSDUCER: Brand: BT2000 AUTOMATED MANIFOLD KIT

## (undated) DEVICE — GW PERIPH GUIDERIGHT STD/EXCHNG/J/TIP SS 0.035IN 5X260CM

## (undated) DEVICE — ADULT, W/LG. BACK PAD, RADIOTRANSPARENT ELEMENT AND LEAD WIRE COMPATIBLE W/: Brand: DEFIBRILLATION ELECTRODES

## (undated) DEVICE — SET PRIMARY GRVTY 10DP MALE LL 104IN

## (undated) DEVICE — NDL ANGIOGR ADV THN SMOTH SGLWALL 21G 1.5

## (undated) DEVICE — CATH DIAG EXPO .056 FL3.5 6F 100CM

## (undated) DEVICE — PAD,NON-ADHERENT,3X8,STERILE,LF,1/PK: Brand: MEDLINE

## (undated) DEVICE — TUBING, SUCTION, 1/4" X 10', STRAIGHT: Brand: MEDLINE

## (undated) DEVICE — ST EXT IV SMRTSTE 2VLV FIX M LL 6ML 41

## (undated) DEVICE — LEX ELECTRO PHYSIOLOGY: Brand: MEDLINE INDUSTRIES, INC.

## (undated) DEVICE — SOL NACL 0.9PCT 1000ML

## (undated) DEVICE — YANKAUER,BULB TIP,W/O VENT,RIGID,STERILE: Brand: MEDLINE

## (undated) DEVICE — ST INF PRI SMRTSTE 20DRP 2VLV 24ML 117

## (undated) DEVICE — TR BAND RADIAL ARTERY COMPRESSION DEVICE: Brand: TR BAND

## (undated) DEVICE — LIMB HOLDER, WRIST/ANKLE: Brand: DEROYAL

## (undated) DEVICE — GLIDESHEATH BASIC HYDROPHILIC COATED INTRODUCER SHEATH: Brand: GLIDESHEATH

## (undated) DEVICE — CATH DIAG EXPO M/ PK 6FR FL4/FR4 PIG 3PK

## (undated) DEVICE — CAUTERY TIP POLISHER: Brand: DEVON

## (undated) DEVICE — CANN NASL CAPNOFLEX SMPL CO2/O2 W/O2/DEL A/

## (undated) DEVICE — ELECTRD RETRN/GRND MEGADYNE SGL/PLT W/CORD 9FT DISP

## (undated) DEVICE — DECANT BG O JET

## (undated) DEVICE — PK CATH CARD 10